# Patient Record
Sex: MALE | Race: WHITE | NOT HISPANIC OR LATINO | Employment: OTHER | URBAN - METROPOLITAN AREA
[De-identification: names, ages, dates, MRNs, and addresses within clinical notes are randomized per-mention and may not be internally consistent; named-entity substitution may affect disease eponyms.]

---

## 2018-05-03 ENCOUNTER — OFFICE VISIT (OUTPATIENT)
Dept: RHEUMATOLOGY | Facility: CLINIC | Age: 33
End: 2018-05-03
Payer: MEDICARE

## 2018-05-03 VITALS — WEIGHT: 228.63 LBS | SYSTOLIC BLOOD PRESSURE: 118 MMHG | DIASTOLIC BLOOD PRESSURE: 78 MMHG

## 2018-05-03 DIAGNOSIS — R76.8 POSITIVE ANA (ANTINUCLEAR ANTIBODY): Primary | ICD-10-CM

## 2018-05-03 LAB
ALBUMIN SERPL-MCNC: 3.6 G/DL (ref 3.1–4.7)
ALP SERPL-CCNC: 98 IU/L (ref 40–104)
ALT (SGPT): 18 IU/L (ref 3–33)
AST SERPL-CCNC: 19 IU/L (ref 10–40)
BASOPHILS NFR BLD: 0 K/UL (ref 0–0.2)
BASOPHILS NFR BLD: 0.4 %
BILIRUB SERPL-MCNC: 0.3 MG/DL (ref 0.3–1)
BUN SERPL-MCNC: 15 MG/DL (ref 8–20)
CALCIUM SERPL-MCNC: 9.1 MG/DL (ref 7.7–10.4)
CHLORIDE: 106 MMOL/L (ref 98–110)
CO2 SERPL-SCNC: 26.2 MMOL/L (ref 22.8–31.6)
CREATININE: 0.95 MG/DL (ref 0.6–1.4)
CRP SERPL-MCNC: 1.21 MG/DL (ref 0–1.4)
EOSINOPHIL NFR BLD: 0.1 K/UL (ref 0–0.7)
EOSINOPHIL NFR BLD: 0.9 %
ERYTHROCYTE [DISTWIDTH] IN BLOOD BY AUTOMATED COUNT: 12.1 % (ref 11.7–14.9)
GLUCOSE: 106 MG/DL (ref 70–99)
GRAN #: 4.8 K/UL (ref 1.4–6.5)
GRAN%: 71.4 %
HCT VFR BLD AUTO: 41.9 % (ref 39–55)
HGB BLD-MCNC: 14.5 G/DL (ref 14–16)
IMMATURE GRANS (ABS): 0 K/UL (ref 0–1)
IMMATURE GRANULOCYTES: 0.6 %
LYMPH #: 1.5 K/UL (ref 1.2–3.4)
LYMPH%: 23.1 %
MCH RBC QN AUTO: 30.2 PG (ref 25–35)
MCHC RBC AUTO-ENTMCNC: 34.6 G/DL (ref 31–36)
MCV RBC AUTO: 87.3 FL (ref 80–100)
MONO #: 0.2 K/UL (ref 0.1–0.6)
MONO%: 3.6 %
NUCLEATED RBCS: 0 %
PLATELET # BLD AUTO: 146 K/UL (ref 140–440)
PMV BLD AUTO: 11.7 FL (ref 8.8–12.7)
POTASSIUM SERPL-SCNC: 3.6 MMOL/L (ref 3.5–5)
PROT SERPL-MCNC: 6.7 G/DL (ref 6–8.2)
RBC # BLD AUTO: 4.8 M/UL (ref 4.3–5.9)
SODIUM: 141 MMOL/L (ref 134–144)
WBC # BLD AUTO: 6.7 K/UL (ref 5–10)

## 2018-05-03 PROCEDURE — 99203 OFFICE O/P NEW LOW 30 MIN: CPT | Mod: ,,, | Performed by: INTERNAL MEDICINE

## 2018-05-03 RX ORDER — LEVOCETIRIZINE DIHYDROCHLORIDE 5 MG/1
5 TABLET, FILM COATED ORAL 2 TIMES DAILY
Refills: 6 | COMMUNITY
Start: 2018-04-20 | End: 2019-05-08 | Stop reason: SDUPTHER

## 2018-05-03 RX ORDER — FENOFIBRATE 54 MG/1
54 TABLET ORAL DAILY
Refills: 1 | COMMUNITY
Start: 2018-02-23 | End: 2018-05-31

## 2018-05-03 RX ORDER — CIMETIDINE HYDROCHLORIDE ORAL SOLUTION 300 MG/5ML
SOLUTION ORAL
Refills: 6 | COMMUNITY
Start: 2018-04-16 | End: 2018-08-09

## 2018-05-03 NOTE — PROGRESS NOTES
[facility] RHEUMATOLOGY        NEW PATIENT      Subjective:       Patient ID:   NAME: Wilver Cee Jr. : 1985     32 y.o. male    Referring Doc: No ref. provider found  Other Physicians:    Chief Complaint:  Urticaria (Stopped fenofibrate for about 2 months. Positive JOSH)      History of Present Illness:     New patient referred for pos JOSH ( 1;320) done sec to hives.  Onset of hives about 2 months ago.Better since on meds prescribed by dermatologist  No other significant complaints as per father.          ROS:   GEN: no fevers night sweats or significant weight changes    fatigue  HEENT: no HA's, changes in vision , no mouth ulcers, no sicca symptoms, no scalp tenderness, jaw claudication  CV: no CP, SOB, PND, CRUZ or orthopnea,no palpitations  PULM:no SOB, cough, hemoptysis, sputum or pleuritic pain  GI: no abdominal pain, nausea, vomiting, constipation, diarrhea, melanotic stools, BRBPR, or hematemesis, no dysphagia  : no hematuria, dysuria  NEURO:no paresthesias, headaches, visual disturbances, muscle weakness  SKIN:  no rashes , erythema, bruising, or swelling, no Raynauds, no photosensitivity  MUSCULOSKELETAL:no joint swelling, no prolonged AM stiffness, no back pain   PSYCH:    Insomnia,   depression,  anxiety    Medications:    Current Outpatient Prescriptions:     cimetidine HCl (TAGAMET) 300 mg/5 mL solution, TAKE 6 ML(S) BY MOUTH TWICE A DAY, Disp: , Rfl: 6    fenofibrate (TRICOR) 54 MG tablet, Take 54 mg by mouth once daily., Disp: , Rfl: 1    levocetirizine (XYZAL) 5 MG tablet, Take 5 mg by mouth 2 (two) times daily., Disp: , Rfl: 6      FAMILY HISTORY: negative for Connective Tissue Disease      PAST MEDICAL HISTORY:Mental Retardation with Autistic tendencies,,A    PAST SURGICAL HISTORY:    SOCIAL HISTORY:Lives with father  Mother  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    Physical  Examination:   GEN: no apparent distress, comfortable; AAOx3  SKIN: few hives on abdomen, no other lesions, no sclerodactyly or induration, no Raynaud's, no periungual erythema  HEAD: normal  EYES: no pallor, no icterus,  NECK: no masses, thyroid normal, trachea midline, no LAD/LN's, supple  CV:   S1 and S2 regular, no murmurs, gallop or rubs  CHEST: Normal respiratory effort;  normal breath sounds; no rubs, no wheezes, no crackles.   ABDOM: nontender and nondistended; soft; ; no rebound/guarding,no masses  MUSC/Skeletal: ROM normal; no crepitus; joints without synovitis, no deformities   EXTREM: no clubbing, cyanosis, edema, normal pulses.  NEURO: grossly intact; motorWNL; AAOx3; no tremors  LYMPH: normal cervical, supraclavicular            Labs:   @RESUFAST(WBC,HGB,HCT,MCV,PLT)  )@RESUFAST(NA,K,CL,CO2,GLU,BUN,Creatinine,Calcium,PROT,Albumin,Bilitot,Alkphos,AST,ALT,JOSH,Sed Rate,CRP,RF,CCP)      Radiology/Diagnostic Studies:    I have reviewed all available labs and XRay reports    Assessment/Plan:   32 y.o. male history of hives for the past 2 months. He has improved after medication prescribed by dermatologist. He has a positive JOSH with a negative profile. No other clear manifestations of a connective tissue disease.  2) Hx of mental retardation with austistic tendencies      PLAN: Complement  Levels, CBC, CMP urinalysis, antiphospholipid antibodies,ANCA,CRP          Discussion:     I have explained all of the above in detail and the patient understands all of the current recommendation(s). I have answered all of their questions to the best of my ability and to their complete satisfaction.      I have reviewed the risks and benefits of the medication in detail with patient, who understands and wishes to proceed. Printed information regarding the disease and/or medication was also provided.        RTC1 month or before if needed        Electronically signed by Saji Eason MD

## 2018-05-05 LAB
C3 SERPL-MCNC: 153 MG/DL (ref 82–167)
C4 NEF SERPL-MCNC: 46 MG/DL (ref 14–44)
RHEUMATOID FACT SERPL-ACNC: <10 IU/ML (ref 0–13.9)

## 2018-05-06 LAB
CARDIOLIPIN IGA SER IA-ACNC: <9 APL U/ML (ref 0–11)
CARDIOLIPIN IGG SER IA-ACNC: <9 GPL U/ML (ref 0–14)
CARDIOLIPIN IGM SER IA-ACNC: <9 MPL U/ML (ref 0–12)

## 2018-05-07 LAB
CCP ANTIBODIES IGG/IGA: 4 UNITS (ref 0–19)
DRVVT CONFIRM: 43.1 SEC (ref 0–47)
LUPUS ANTICOAGULANT INTERPRETATION: NORMAL
PTT-LA MIX: 40.1 SEC (ref 0–51.9)

## 2018-05-08 LAB
B2 GLYCOPROTEIN I IGA: <9 GPI IGA UNITS (ref 0–25)
B2 GLYCOPROTEIN I IGG: <9 GPI IGG UNITS (ref 0–20)
B2 GLYCOPROTEIN I IGM: <9 GPI IGM UNITS (ref 0–32)

## 2018-05-21 ENCOUNTER — TELEPHONE (OUTPATIENT)
Dept: RHEUMATOLOGY | Facility: CLINIC | Age: 33
End: 2018-05-21

## 2018-05-21 NOTE — TELEPHONE ENCOUNTER
Anca, lupus anticoagulant and urinalysis not run as ordered on 5/3/18.  Patient has appt 5/31/18 .  Call pt back before appt or wait till appt.

## 2018-05-22 NOTE — TELEPHONE ENCOUNTER
Patient to have labs done before next visit if possible. If not will do at next visit . Per Dr. TONI Eason.  Lab will notify patient

## 2018-05-31 ENCOUNTER — OFFICE VISIT (OUTPATIENT)
Dept: RHEUMATOLOGY | Facility: CLINIC | Age: 33
End: 2018-05-31
Payer: MEDICARE

## 2018-05-31 VITALS — WEIGHT: 229.31 LBS | SYSTOLIC BLOOD PRESSURE: 128 MMHG | DIASTOLIC BLOOD PRESSURE: 84 MMHG

## 2018-05-31 DIAGNOSIS — R76.8 POSITIVE ANA (ANTINUCLEAR ANTIBODY): Primary | ICD-10-CM

## 2018-05-31 PROCEDURE — 99213 OFFICE O/P EST LOW 20 MIN: CPT | Mod: ,,, | Performed by: INTERNAL MEDICINE

## 2018-05-31 RX ORDER — ONDANSETRON HYDROCHLORIDE 4 MG/5ML
SOLUTION ORAL
Refills: 1 | COMMUNITY
Start: 2018-05-09 | End: 2022-06-29

## 2018-05-31 RX ORDER — PREDNISONE 20 MG/1
20 TABLET ORAL DAILY
COMMUNITY
End: 2018-08-09 | Stop reason: ALTCHOICE

## 2018-05-31 NOTE — PROGRESS NOTES
[facility] RHEUMATOLOGY            PROGRESS NOTE      Subjective:       Patient ID:   NAME: Wilver Cee Jr. : 1985     32 y.o. male    Referring Doc: No ref. provider found  Other Physicians:    Chief Complaint:  Positive JOSH      History of Present Illness:     Patient returns today for a regularly scheduled follow-up visit for + JOSH       The patient ,as per father. Doing well. Hives control with Tagamet and Xyzal.  Complains of chest pains cough. No joint swelling or complaints of arthralgias. No Raynaud's. No mucosal ulcerations.            ROS:   GEN:  No  fever, night sweats . weight is stable   No fatigue  SKIN: no rashes, no bruising, no ulcerations, no Raynaud's  HEENT: no HA's, No visual changes, no mucosal ulcers, no sicca symptoms,  CV:   no CP, SOB, PND, CRUZ, no orthopnea, no palpitations  PULM: normal with no SOB, cough, hemoptysis, sputum or pleuritic pain  GI:  no abdominal pain, nausea, vomiting, constipation, diarrhea, melanotic stools, BRBPR, hematemesis, no dysphagia  :   no dysuria  NEURO: no paresthesias, headaches, visual disturbances, muscle weakness  MUSCULOSKELETAL:no joint swelling, prolonged AM stiffness, no back pain, no muscle pain  Allergies:  Review of patient's allergies indicates:  No Known Allergies    Medications:    Current Outpatient Prescriptions:     cimetidine HCl (TAGAMET) 300 mg/5 mL solution, TAKE 6 ML(S) BY MOUTH TWICE A DAY, Disp: , Rfl: 6    levocetirizine (XYZAL) 5 MG tablet, Take 5 mg by mouth 2 (two) times daily., Disp: , Rfl: 6    ondansetron (ZOFRAN) 4 mg/5 mL solution, 5 ML BY MOUTH THREE TIMES A DAY AS NEEDED, Disp: , Rfl: 1    predniSONE (DELTASONE) 20 MG tablet, Take 20 mg by mouth once daily., Disp: , Rfl:     PMHx/PSHx Updates:        Objective:     Vitals:  Blood pressure 128/84, weight 104 kg (229 lb 4.8 oz).    Physical Examination:   GEN: no apparent distress, comfortable; AAOx3  SKIN: no rashes,no ulceration, no Raynaud's, no  petechiae, no SQ nodules,  HEAD: normal  EYES: no pallor, no icterus,  NECK: no masses, thyroid normal, trachea midline, no LAD/LN's, supple  CV: RRR with no murmur; l S1 and S2 reg. ,no gallop no rubs,   CHEST: Normal respiratory effort; CTAB; normal breath sounds; no wheeze or crackles  ABDOM: nontender and nondistended; soft; no masses; no rebound/guarding  MUSC/Skeletal: ROM normal; no crepitus; joints without synovitis,  no deformities  No joint swelling or tenderness of PIP, MCP, wrist, elbow, shoulder, or knee joints  EXTREM: no clubbing, cyanosis, no edema,normal  pulses   NEURO: grossly intact; motor WNL; AAOx3;   PSYCH: normal mood, affect and behavior  LYMPH: normal cervical, supraclavicular          Labs:   Lab Results   Component Value Date    WBC 6.7 05/03/2018    HGB 14.5 05/03/2018    HCT 41.9 05/03/2018    MCV 87.3 05/03/2018     05/03/2018    CMP  @LASTLAB(NA,K,CL,CO2,GLU,BUN,Creatinine,Calcium,PROT,Albumin,Bilitot,Alkphos,AST,ALT,CRP,ESR,RF,CCP,JOSH,SSA,CPK,uric acid) )@  I have reviewed all available lab results and radiology reports.    Radiology/Diagnostic Studies:        Assessment/Plan:   (1) 32 y.o. male with diagnosis of Positive JOSH 1 in 320 done secondary to hives. JOSH profile was negative, antiphospholipids neg, . CBC, CMP within normal range. Urinalysis was not done as ordered. Complement levels are not decreased  Hives are well controlled with medication given by dermatology.   2) Hx MR with Autistic Tendencies      Plan :UA   FU in  4 months      Discussion:     I have explained all of the above in detail and the patient understands all of the current recommendation(s). I have answered all questions to the best of my ability and to their complete satisfaction.       The patient is to continue with the current management plan         RTC in   Warm months      Electronically signed by Saji Eason MD

## 2018-08-09 ENCOUNTER — OFFICE VISIT (OUTPATIENT)
Dept: ALLERGY | Facility: CLINIC | Age: 33
End: 2018-08-09
Payer: MEDICARE

## 2018-08-09 VITALS
DIASTOLIC BLOOD PRESSURE: 78 MMHG | WEIGHT: 223.63 LBS | HEIGHT: 71 IN | SYSTOLIC BLOOD PRESSURE: 122 MMHG | BODY MASS INDEX: 31.31 KG/M2

## 2018-08-09 DIAGNOSIS — R53.82 CHRONIC FATIGUE: ICD-10-CM

## 2018-08-09 DIAGNOSIS — L50.8 CHRONIC URTICARIA: Primary | ICD-10-CM

## 2018-08-09 DIAGNOSIS — R11.10 CHRONIC VOMITING: ICD-10-CM

## 2018-08-09 PROCEDURE — 99204 OFFICE O/P NEW MOD 45 MIN: CPT | Mod: ,,, | Performed by: ALLERGY & IMMUNOLOGY

## 2018-08-09 RX ORDER — LORATADINE 10 MG/1
10 TABLET ORAL DAILY PRN
COMMUNITY

## 2018-08-09 RX ORDER — FEXOFENADINE HCL 60 MG
60 TABLET ORAL 2 TIMES DAILY
Qty: 60 TABLET | Refills: 3 | Status: SHIPPED | OUTPATIENT
Start: 2018-08-09 | End: 2022-06-29

## 2018-08-09 NOTE — PROGRESS NOTES
"Subjective:       Patient ID: Wilver Cee Jr. is a 33 y.o. male.    Chief Complaint: Urticaria (Breakouts have been everyday)    HPI     Pt presents as a consult from Dr. Tanner Robbins for urticaria.     Onset: march of this year  Frequ: daily   Location: generalized   Tx: h1 and h2 and prednisone   Asso: swelling with rash     He has been vomiting recurrently for 7-8 years. Not related per say.     Father denies that his thyroid has been evaluated and has concern about possible hyperglycemia and DM.   Pt has cognitive delay and Father remains caregiver.       Review of Systems      General: neg unexpected weight changes, fevers, chills, night sweats, malaise  HEENT: see hpi, Neg eye pain, vision changes, ear drainage, nose bleeds, throat tightness, sores in the mouth  CV: Neg chest pain, palpitations, swelling  Resp: see hpi, neg shortness of breath, hemoptysis, cough  GI: see hpi, neg dysphagia, night abdominal pain, reflux, chronic diarrhea, chronic constipation  Derm: See Hpi, neg flushing  Mu/sk: Neg joint pain, joint swelling   Psych: Neg anxiety  neuro: neg chronic headaches, muscle weakness  Endo: neg heat/cold intolerance, chronic fatigue    Objective:     Vitals:    08/09/18 1042   BP: 122/78   Weight: 101.4 kg (223 lb 9.6 oz)   Height: 5' 11" (1.803 m)        Physical Exam      General: no acute distress, well developed well nourished - mental cognition impaired. With father   Skin: multiple erythematous papules on the left forearm.       Assessment:       1. Chronic urticaria    2. Chronic vomiting    3. Chronic fatigue        Plan:       Chronic urticaria  -     fexofenadine (ALLEGRA) 60 MG tablet; Take 1 tablet (60 mg total) by mouth 2 (two) times daily.  Dispense: 60 tablet; Refill: 3  -     Hemoglobin A1C; Future; Expected date: 08/09/2018  -     TSH; Future; Expected date: 08/09/2018    Chronic vomiting    Chronic fatigue  -     TSH; Future; Expected date: 08/09/2018    Other orders  -     " Hemoglobin A1c      Follow up in 4 weeks     Discussed face to face > 50 % > 45 mins discussing what causes hives, medications, urticaria action plan, testing, and answered all questions.     Reviewed all outside labs- negative inhalant panel, assumed labs were fasting- glucose 106 therefore a1c ordered. crp normal. Other wise cbc and cmp wnl.         Alessandra Martinez M.D.  Allergy/Immunology  [facility]   [phone] phone  [phone] fax

## 2018-08-10 PROBLEM — R73.9 HYPERGLYCEMIA: Status: RESOLVED | Noted: 2018-08-10 | Resolved: 2018-08-10

## 2018-08-10 PROBLEM — R53.82 CHRONIC FATIGUE: Status: ACTIVE | Noted: 2018-08-10

## 2018-08-10 PROBLEM — R73.9 HYPERGLYCEMIA: Status: ACTIVE | Noted: 2018-08-10

## 2018-08-10 LAB
HBA1C MFR BLD: 5.4 % (ref 4.8–5.6)
TSH SERPL DL<=0.005 MIU/L-ACNC: 2.49 UIU/ML (ref 0.45–4.5)

## 2018-09-12 ENCOUNTER — OFFICE VISIT (OUTPATIENT)
Dept: ALLERGY | Facility: CLINIC | Age: 33
End: 2018-09-12
Payer: MEDICARE

## 2018-09-12 VITALS
WEIGHT: 232 LBS | DIASTOLIC BLOOD PRESSURE: 76 MMHG | HEIGHT: 70 IN | SYSTOLIC BLOOD PRESSURE: 122 MMHG | BODY MASS INDEX: 33.21 KG/M2

## 2018-09-12 DIAGNOSIS — B35.4 TINEA CORPORIS: ICD-10-CM

## 2018-09-12 DIAGNOSIS — L50.8 CHRONIC URTICARIA: Primary | ICD-10-CM

## 2018-09-12 PROCEDURE — 99215 OFFICE O/P EST HI 40 MIN: CPT | Mod: ,,, | Performed by: ALLERGY & IMMUNOLOGY

## 2018-09-12 RX ORDER — ERGOCALCIFEROL 1.25 MG/1
50000 CAPSULE ORAL
COMMUNITY
End: 2019-05-08

## 2018-09-12 RX ORDER — FLUCONAZOLE 40 MG/ML
100 POWDER, FOR SUSPENSION ORAL DAILY
Qty: 12.5 ML | Refills: 0 | Status: SHIPPED | OUTPATIENT
Start: 2018-09-12 | End: 2018-09-17

## 2018-09-12 NOTE — PATIENT INSTRUCTIONS
Hives:  Allegra 180 mg 1 pill twice per day   pepcid 20 mg 1 pill twice per day if needed  May max out allegra 180 2 pills twice per day.     Fungal infection:  Take fluconazole 2.5 mL once per day for five days, if better in 3 days stop  Use over the counter antifungal spray four times per day - may need to use 6 weeks   Use nystatin powder on feet and groin 4 times per day as needed as well.     May interchange or rotate allegra, loratadine, claritin, zyrtec, xyzal they all have same mechanism of action.     Follow up in 8 weeks.

## 2018-09-12 NOTE — PROGRESS NOTES
"Subjective:       Patient ID: Wilver Cee Jr. is a 33 y.o. male.    Chief Complaint: Urticaria (recurrent hives since March)    HPI     Pt presents for urticaria.     Condition: improved  Frequ: daily - mainly at night   Location: generalized   Tx: h1 and h2 and prednisone- prn   1 allegra 60 mg bid.   Discussed increasing to 180 mg bid.    Asso: swelling with rash   Onset: march of this year  He has been vomiting recurrently for 7-8 years. Not related per say.   He has been battling tinea on the skin for 5 months.     Pt has cognitive delay and Father remains caregiver.     Pt had normal A1C levels and TSH    Component      Latest Ref Rng & Units 8/9/2018   TSH      0.450 - 4.500 uIU/mL 2.490   Hemoglobin A1C      4.8 - 5.6 % 5.4       Review of Systems      General: neg unexpected weight changes, fevers, chills, night sweats, malaise  HEENT: see hpi, Neg eye pain, vision changes, ear drainage, nose bleeds, throat tightness, sores in the mouth  CV: Neg chest pain, palpitations, swelling  Resp: see hpi, neg shortness of breath, hemoptysis, cough  GI: see hpi, neg dysphagia, night abdominal pain, reflux, chronic diarrhea, chronic constipation  Derm: See Hpi, neg flushing  Mu/sk: Neg joint pain, joint swelling   Psych: Neg anxiety  neuro: neg chronic headaches, muscle weakness  Endo: neg heat/cold intolerance, chronic fatigue    Objective:     Vitals:    09/12/18 1444   BP: 122/76   Weight: 105.2 kg (232 lb)   Height: 5' 10" (1.778 m)        Physical Exam      General: no acute distress, well developed well nourished - mental cognition impaired. With father   Skin: multiple erythematous papules on the left forearm. Xerosis bilateral heels.       Assessment:       1. Chronic urticaria    2. Tinea corporis        Plan:       Chronic urticaria    Tinea corporis  -     fluconazole 40 mg/ml (DIFLUCAN) 40 mg/mL suspension; Take 2.5 mLs (100 mg total) by mouth once daily. for 5 days  Dispense: 12.5 mL; Refill: " 0      Allegra 180 mg 1 pill twice per day   pepcid 20 mg 1 pill twice per day if needed  May max out allegra 180 2 pills twice per day.     Fungal infection:  Take fluconazole 2.5 mL once per day for five days, if better in 3 days stop  Use over the counter antifungal spray four times per day - may need to use 6 weeks   Use nystatin powder on feet and groin 4 times per day as needed as well.           Follow up in 8 weeks     Discussed face to face > 50 % > 40 mins discussing what causes hives, medications, urticaria action plan, testing, and answered all questions.     Reviewed labs- negative inhalant panel, a1c crp tsh normal. cbc and cmp wnl.         Alessandra Martinez M.D.  Allergy/Immunology  [facility]   [phone] phone  [phone] fax

## 2018-09-13 ENCOUNTER — TELEPHONE (OUTPATIENT)
Dept: ALLERGY | Facility: CLINIC | Age: 33
End: 2018-09-13

## 2018-09-20 ENCOUNTER — OFFICE VISIT (OUTPATIENT)
Dept: RHEUMATOLOGY | Facility: CLINIC | Age: 33
End: 2018-09-20
Payer: MEDICARE

## 2018-09-20 VITALS — SYSTOLIC BLOOD PRESSURE: 108 MMHG | WEIGHT: 228 LBS | DIASTOLIC BLOOD PRESSURE: 72 MMHG | BODY MASS INDEX: 32.71 KG/M2

## 2018-09-20 DIAGNOSIS — E55.9 HYPOVITAMINOSIS D: ICD-10-CM

## 2018-09-20 DIAGNOSIS — R76.8 POSITIVE ANA (ANTINUCLEAR ANTIBODY): Primary | ICD-10-CM

## 2018-09-20 LAB
ALBUMIN SERPL-MCNC: 3.9 G/DL (ref 3.1–4.7)
ALP SERPL-CCNC: 85 IU/L (ref 40–104)
ALT (SGPT): 16 IU/L (ref 3–33)
AST SERPL-CCNC: 19 IU/L (ref 10–40)
BASOPHILS NFR BLD: 0 K/UL (ref 0–0.2)
BASOPHILS NFR BLD: 0.2 %
BILIRUB SERPL-MCNC: 0.4 MG/DL (ref 0.3–1)
BUN SERPL-MCNC: 15 MG/DL (ref 8–20)
CALCIUM SERPL-MCNC: 8.7 MG/DL (ref 7.7–10.4)
CHLORIDE: 103 MMOL/L (ref 98–110)
CO2 SERPL-SCNC: 25.9 MMOL/L (ref 22.8–31.6)
CREATININE: 0.97 MG/DL (ref 0.6–1.4)
EOSINOPHIL NFR BLD: 0.1 K/UL (ref 0–0.7)
EOSINOPHIL NFR BLD: 1.6 %
ERYTHROCYTE [DISTWIDTH] IN BLOOD BY AUTOMATED COUNT: 13.1 % (ref 11.7–14.9)
GLUCOSE: 106 MG/DL (ref 70–99)
GRAN #: 3.8 K/UL (ref 1.4–6.5)
GRAN%: 70 %
HCT VFR BLD AUTO: 46.2 % (ref 39–55)
HGB BLD-MCNC: 15.4 G/DL (ref 14–16)
IMMATURE GRANS (ABS): 0 K/UL (ref 0–1)
IMMATURE GRANULOCYTES: 0.5 %
LYMPH #: 1.4 K/UL (ref 1.2–3.4)
LYMPH%: 25.7 %
MCH RBC QN AUTO: 29.1 PG (ref 25–35)
MCHC RBC AUTO-ENTMCNC: 33.3 G/DL (ref 31–36)
MCV RBC AUTO: 87.2 FL (ref 80–100)
MONO #: 0.1 K/UL (ref 0.1–0.6)
MONO%: 2 %
NUCLEATED RBCS: 0 %
PLATELET # BLD AUTO: 143 K/UL (ref 140–440)
PMV BLD AUTO: 11.3 FL (ref 8.8–12.7)
POTASSIUM SERPL-SCNC: 4 MMOL/L (ref 3.5–5)
PROT SERPL-MCNC: 6.6 G/DL (ref 6–8.2)
RBC # BLD AUTO: 5.3 M/UL (ref 4.3–5.9)
SODIUM: 136 MMOL/L (ref 134–144)
VITAMIN D, 1,25 (OH)2: 30.7 NG/ML (ref 30–100)
WBC # BLD AUTO: 5.5 K/UL (ref 5–10)

## 2018-09-20 PROCEDURE — 99213 OFFICE O/P EST LOW 20 MIN: CPT | Mod: ,,, | Performed by: INTERNAL MEDICINE

## 2018-09-20 RX ORDER — NYSTATIN 100000 [USP'U]/G
POWDER TOPICAL 2 TIMES DAILY
Refills: 1 | COMMUNITY
Start: 2018-08-20 | End: 2021-09-28 | Stop reason: SDUPTHER

## 2018-09-20 RX ORDER — CLOTRIMAZOLE 1 G/ML
SOLUTION TOPICAL 2 TIMES DAILY
COMMUNITY
End: 2022-06-29

## 2018-09-20 RX ORDER — DIPHENHYDRAMINE HCL 25 MG
25 TABLET ORAL NIGHTLY PRN
COMMUNITY
End: 2020-11-11

## 2018-09-20 NOTE — PROGRESS NOTES
[facility] RHEUMATOLOGY            PROGRESS NOTE      Subjective:       Patient ID:   NAME: Wilver Cee Jr. : 1985     33 y.o. male    Referring Doc: No ref. provider found  Other Physicians:    Chief Complaint:  Positive JOSH (Still has hives. Has had Prednisone 20mg twice in 5 months)      History of Present Illness:     Patient returns today for a regularly scheduled follow-up visit for positive JOSH.      The patient CNS with chronic hives. He was seen in the emergency room due to vomiting and vasovagal syncope  No joint swelling. Father has not noticed any mucosal ulcerations.          ROS:   GEN:  No  fever, night sweats . weight is stable   No apparent  fatigue  SKIN: + hives, no bruising, no ulcerations, no Raynaud's  HEENT: no HA's, No visual changes, no mucosal ulcers, no sicca symptoms,  CV:   no CP, SOB, PND, CRUZ, no orthopnea, no palpitations  PULM: normal with no SOB, cough, hemoptysis, sputum or pleuritic pain  GI:  no abdominal pain, nausea, + occ vomiting, constipation, diarrhea, melanotic stools, BRBPR, hematemesis  MUSCULOSKELETAL:no joint swelling, prolonged AM stiffness, no back pain, no muscle pain  Allergies:  Review of patient's allergies indicates:  No Known Allergies    Medications:    Current Outpatient Medications:     clotrimazole (LOTRIMIN) 1 % Soln, Apply topically 2 (two) times daily., Disp: , Rfl:     diphenhydrAMINE (SOMINEX) 25 mg tablet, Take 25 mg by mouth nightly as needed for Insomnia., Disp: , Rfl:     ergocalciferol (VITAMIN D2) 50,000 unit Cap, Take 50,000 Units by mouth every 7 days., Disp: , Rfl:     fexofenadine (ALLEGRA) 60 MG tablet, Take 1 tablet (60 mg total) by mouth 2 (two) times daily., Disp: 60 tablet, Rfl: 3    levocetirizine (XYZAL) 5 MG tablet, Take 5 mg by mouth 2 (two) times daily., Disp: , Rfl: 6    loratadine (CLARITIN) 10 mg tablet, Take 10 mg by mouth daily as needed for Allergies., Disp: , Rfl:     NYSTOP powder, 2 (two) times daily.  Apply to affected area, Disp: , Rfl: 1    ondansetron (ZOFRAN) 4 mg/5 mL solution, 5 ML BY MOUTH THREE TIMES A DAY AS NEEDED, Disp: , Rfl: 1    PMHx/PSHx Updates:        Objective:     Vitals:  Blood pressure 108/72, weight 103.4 kg (228 lb).    Physical Examination:   GEN: no apparent distress, comfortable; AAOx3  SKIN: +hives arms,dorsum hands ,no ulceration, no Raynaud's, no petechiae, no SQ nodules,  HEAD: normal  EYES: no pallor, no icterus,   NECK: no masses, thyroid normal, trachea midline, no LAD/LN's, supple  CV: RRR with no murmur; l S1 and S2 reg. ,no gallop no rubs,   CHEST: Normal respiratory effort; CTAB; normal breath sounds; no wheeze or crackles  MUSC/Skeletal: ROM normal; no crepitus; joints without synovitis,  no deformities  No joint swelling or tenderness of PIP, MCP, wrist, elbow, shoulder, or knee joints  EXTREM: no clubbing, cyanosis, no edema,normal  pulses   NEURO: grossly intact; motor WNL; AAOx3;   LYMPH: normal cervical, supraclavicular          Labs:   Lab Results   Component Value Date    WBC 6.7 05/03/2018    HGB 14.5 05/03/2018    HCT 41.9 05/03/2018    MCV 87.3 05/03/2018     05/03/2018    CMP  @LASTLAB(NA,K,CL,CO2,GLU,BUN,Creatinine,Calcium,PROT,Albumin,Bilitot,Alkphos,AST,ALT,CRP,ESR,RF,CCP,JOSH,SSA,CPK,uric acid) )@  I have reviewed all available lab results and radiology reports.    Radiology/Diagnostic Studies:        Assessment/Plan:   (1) 33 y.o. male with diagnosis of positive JOSH 1 in 320. History of chronic hives. No other complaints as per father.  2) MR  with autistic  tendencies      CBC CMP urinalysis. Father requests vitamin D level.        Discussion:     I have explained all of the above in detail and the patient understands all of the current recommendation(s). I have answered all questions to the best of my ability and to their complete satisfaction.       The patient is to continue with the current management plan         RTC in   4 months or before if  needed      Electronically signed by Saji Eason MD

## 2018-11-07 ENCOUNTER — OFFICE VISIT (OUTPATIENT)
Dept: ALLERGY | Facility: CLINIC | Age: 33
End: 2018-11-07
Payer: MEDICARE

## 2018-11-07 VITALS
SYSTOLIC BLOOD PRESSURE: 122 MMHG | HEIGHT: 71 IN | WEIGHT: 225 LBS | DIASTOLIC BLOOD PRESSURE: 76 MMHG | BODY MASS INDEX: 31.5 KG/M2

## 2018-11-07 DIAGNOSIS — L50.8 CHRONIC URTICARIA: Primary | ICD-10-CM

## 2018-11-07 DIAGNOSIS — B35.3 TINEA PEDIS OF BOTH FEET: ICD-10-CM

## 2018-11-07 PROCEDURE — 99213 OFFICE O/P EST LOW 20 MIN: CPT | Mod: ,,, | Performed by: ALLERGY & IMMUNOLOGY

## 2018-11-07 NOTE — PATIENT INSTRUCTIONS
Continue current regimen.     Vitamin D 2000, IU  Daily.       Nystatin can be used four times per day.

## 2018-11-07 NOTE — PROGRESS NOTES
"Subjective:       Patient ID: Wilver Cee Jr. is a 33 y.o. male.    Chief Complaint: Urticaria (doing better - not as often or as many)    HPI     Pt presents for urticaria.     Condition: improved  Frequ: daily - mainly at night   Location: generalized   Pt doing well with vitamin D and stopped sweet n low.   Tx: h1 and h2 and prednisone- prn alternativing allegra, claritin, xyzal. pepcid three times per week.   1 allegra 60 mg, bid.   Last week was most recent episode.   Asso: swelling with rash   Onset: march of this year  He has been vomiting recurrently for 7-8 years. Not related per say.   He has been battling tinea on the skin for 5 months. - since treatment at the last visit, on the feet cleared. Groin still an issue however.     Pt has cognitive delay and Father remains caregiver.     Pt had normal A1C levels and TSH    Component      Latest Ref Rng & Units 8/9/2018   TSH      0.450 - 4.500 uIU/mL 2.490   Hemoglobin A1C      4.8 - 5.6 % 5.4       Review of Systems      General: neg unexpected weight changes, fevers, chills, night sweats, malaise  HEENT: see hpi, Neg eye pain, vision changes, ear drainage, nose bleeds, throat tightness, sores in the mouth  CV: Neg chest pain, palpitations, swelling  Resp: see hpi, neg shortness of breath, hemoptysis, cough  GI: see hpi, neg dysphagia, night abdominal pain, reflux, chronic diarrhea, chronic constipation  Derm: See Hpi, neg flushing  Mu/sk: Neg joint pain, joint swelling   Psych: Neg anxiety  neuro: neg chronic headaches, muscle weakness  Endo: neg heat/cold intolerance, chronic fatigue    Objective:     Vitals:    11/07/18 1422   BP: 122/76   Weight: 102.1 kg (225 lb)   Height: 5' 11" (1.803 m)        Physical Exam      General: no acute distress, well developed well nourished - mental cognition impaired. With father   Skin: feet with xerosis on the bottom of the heels. No urticarial lesions.       Assessment:       1. Chronic urticaria    2. Tinea " pedis of both feet        Plan:       Chronic urticaria    Tinea pedis of both feet      Allegra  mg 1 pill twice per day  Or any antihistamine   pepcid 20 mg 1 pill twice per day if needed   May max out allegra 180 2 pills twice per day.   Once urticaria is gone x 4 weeks then, may start to taper and discussed today.     Continue vitamin D supplementation.     Fungal infection:  Increase nystatin to QID.       Follow up in 6 months.     Discussed face to face > 50 % > 30 mins discussing what causes hives, medications, urticaria action plan, testing, and answered all questions.     Reviewed labs- negative inhalant panel, a1c crp tsh normal. cbc and cmp wnl.         Alessandra Martinez M.D.  Allergy/Immunology  [facility]   [phone] phone  [phone] fax

## 2019-01-16 ENCOUNTER — OFFICE VISIT (OUTPATIENT)
Dept: RHEUMATOLOGY | Facility: CLINIC | Age: 34
End: 2019-01-16
Payer: MEDICARE

## 2019-01-16 VITALS
DIASTOLIC BLOOD PRESSURE: 78 MMHG | SYSTOLIC BLOOD PRESSURE: 119 MMHG | BODY MASS INDEX: 32.09 KG/M2 | WEIGHT: 230.13 LBS

## 2019-01-16 DIAGNOSIS — R76.8 POSITIVE ANA (ANTINUCLEAR ANTIBODY): Primary | ICD-10-CM

## 2019-01-16 PROCEDURE — 99213 PR OFFICE/OUTPT VISIT, EST, LEVL III, 20-29 MIN: ICD-10-PCS | Mod: ,,, | Performed by: INTERNAL MEDICINE

## 2019-01-16 PROCEDURE — 99213 OFFICE O/P EST LOW 20 MIN: CPT | Mod: ,,, | Performed by: INTERNAL MEDICINE

## 2019-01-17 NOTE — PROGRESS NOTES
[facility] RHEUMATOLOGY            PROGRESS NOTE      Subjective:       Patient ID:   NAME: Wilver Cee Jr. : 1985     33 y.o. male    Referring Doc: No ref. provider found  Other Physicians:    Chief Complaint:  Positive JOSH      History of Present Illness:     Patient returns today for a regularly scheduled follow-up visit for   + JOSH      Father states continues with episodes of hives.  No apparent cough or shortness of breath. No complaints of joint pain. Father states occasionally they right hand looks diffusely swollen.            ROS:   GEN:  No  fever, night sweats . weight is stable   ? fatigue  SKIN: hives on and off, no bruising, no ulcerations, no Raynaud's  HEENT: no HA's, No visual changes, no mucosal ulcers, no sicca symptoms,  PULM: normal with no  Apparent SOB, cough, hemoptysis, sputum or pleuritic pain  GI:  no abdominal pain  :   no dysuria  NEURO: no paresthesias, headaches, visual disturbances, muscle weakness  MUSCULOSKELETAL:no joint swelling, prolonged AM stiffness, no back pain, no muscle pain  Allergies:  Review of patient's allergies indicates:  No Known Allergies    Medications:    Current Outpatient Medications:     clotrimazole (LOTRIMIN) 1 % Soln, Apply topically 2 (two) times daily., Disp: , Rfl:     diphenhydrAMINE (SOMINEX) 25 mg tablet, Take 25 mg by mouth nightly as needed for Insomnia., Disp: , Rfl:     ergocalciferol (VITAMIN D2) 50,000 unit Cap, Take 50,000 Units by mouth every 7 days., Disp: , Rfl:     famotidine (PEPCID ORAL), Take by mouth., Disp: , Rfl:     fexofenadine (ALLEGRA) 60 MG tablet, Take 1 tablet (60 mg total) by mouth 2 (two) times daily., Disp: 60 tablet, Rfl: 3    levocetirizine (XYZAL) 5 MG tablet, Take 5 mg by mouth 2 (two) times daily., Disp: , Rfl: 6    loratadine (CLARITIN) 10 mg tablet, Take 10 mg by mouth daily as needed for Allergies., Disp: , Rfl:     NYSTOP powder, 2 (two) times daily. Apply to affected area, Disp: , Rfl:  1    ondansetron (ZOFRAN) 4 mg/5 mL solution, 5 ML BY MOUTH THREE TIMES A DAY AS NEEDED, Disp: , Rfl: 1    PMHx/PSHx Updates:        Objective:     Vitals:  Blood pressure 119/78, weight 104.4 kg (230 lb 1.6 oz).    Physical Examination:   GEN: no apparent distress, comfortable; AAOx3  SKIN: no rashes,no ulceration, no Raynaud's, no petechiae, no SQ nodules,  HEAD: normal  EYES: no pallor, no icterus,  NECK: no masses, thyroid normal, trachea midline, no LAD/LN's, supple  CV: RRR with no murmur; l S1 and S2 reg. ,no gallop no rubs,   CHEST: Normal respiratory effort; CTAB; normal breath sounds; no wheeze or crackles  MUSC/Skeletal: ROM normal; no crepitus; joints without synovitis,  No joint swelling or tenderness of PIP, MCP, wrist, elbow, shoulder, or knee joints  EXTREM: no clubbing, cyanosis, no edema,normal  pulses   NEURO: grossly intact; motor WNL; AAOx3;  LYMPH: normal cervical, supraclavicular          Labs:   Lab Results   Component Value Date    WBC 5.5 09/20/2018    HGB 15.4 09/20/2018    HCT 46.2 09/20/2018    MCV 87.2 09/20/2018     09/20/2018    CMP  @LASTLAB(NA,K,CL,CO2,GLU,BUN,Creatinine,Calcium,PROT,Albumin,Bilitot,Alkphos,AST,ALT,CRP,ESR,RF,CCP,JOSH,SSA,CPK,uric acid) )@  I have reviewed all available lab results and radiology reports.    Radiology/Diagnostic Studies:        Assessment/Plan:   (1) 33 y.o. male with diagnosis of positive JOSH 1:640 no other signs or symptoms of  connective tissue disease. Most recent blood work showed a normal CBC except for slight thrombocytopenia of 139,000. Normal CMP. Will do urinalysis today.  2) Hx hives  3) Hx MR    Repeat CBC in a few weeks.      Discussion:     I have explained all of the above in detail and the patient understands all of the current recommendation(s). I have answered all questions to the best of my ability and to their complete satisfaction.       The patient is to continue with the current management plan         RTC in   3  months      Electronically signed by Saji Eason MD

## 2019-02-13 LAB
BASOPHILS NFR BLD: 0 K/UL (ref 0–0.2)
BASOPHILS NFR BLD: 0.1 %
EOSINOPHIL NFR BLD: 0 K/UL (ref 0–0.7)
EOSINOPHIL NFR BLD: 0.5 %
ERYTHROCYTE [DISTWIDTH] IN BLOOD BY AUTOMATED COUNT: 12.8 % (ref 11.7–14.9)
GRAN #: 6.4 K/UL (ref 1.4–6.5)
GRAN%: 80.6 %
HCT VFR BLD AUTO: 44.7 % (ref 39–55)
HGB BLD-MCNC: 15.2 G/DL (ref 14–16)
IMMATURE GRANS (ABS): 0.1 K/UL (ref 0–1)
IMMATURE GRANULOCYTES: 0.6 %
LYMPH #: 1.2 K/UL (ref 1.2–3.4)
LYMPH%: 15.1 %
MCH RBC QN AUTO: 30.3 PG (ref 25–35)
MCHC RBC AUTO-ENTMCNC: 34 G/DL (ref 31–36)
MCV RBC AUTO: 89 FL (ref 80–100)
MONO #: 0.3 K/UL (ref 0.1–0.6)
MONO%: 3.1 %
NUCLEATED RBCS: 0 %
PLATELET # BLD AUTO: 157 K/UL (ref 140–440)
PMV BLD AUTO: 11.2 FL (ref 8.8–12.7)
RBC # BLD AUTO: 5.02 M/UL (ref 4.3–5.9)
WBC # BLD AUTO: 8 K/UL (ref 5–10)

## 2019-02-15 ENCOUNTER — TELEPHONE (OUTPATIENT)
Dept: ALLERGY | Facility: CLINIC | Age: 34
End: 2019-02-15

## 2019-02-15 NOTE — TELEPHONE ENCOUNTER
Patient's father is calling with reports that he thinks Wilver may be allergic to Shrimp.  He would like to have a skin test done to verify the negative lab work done a year ago by Dr. Cutler.  The last note does not mention doing a skin test, what to do?

## 2019-02-18 NOTE — TELEPHONE ENCOUNTER
Patient's father states that he has been without shrimp for 14+ days and the hives/rash have subsided.  He would like to continue with avoidance to see if this solves the problem.  He will call back with any needs.

## 2019-04-30 ENCOUNTER — TELEPHONE (OUTPATIENT)
Dept: RHEUMATOLOGY | Facility: CLINIC | Age: 34
End: 2019-04-30

## 2019-05-07 ENCOUNTER — OFFICE VISIT (OUTPATIENT)
Dept: RHEUMATOLOGY | Facility: CLINIC | Age: 34
End: 2019-05-07
Payer: MEDICARE

## 2019-05-07 VITALS — SYSTOLIC BLOOD PRESSURE: 114 MMHG | BODY MASS INDEX: 30.75 KG/M2 | WEIGHT: 220.5 LBS | DIASTOLIC BLOOD PRESSURE: 78 MMHG

## 2019-05-07 DIAGNOSIS — R76.8 POSITIVE ANA (ANTINUCLEAR ANTIBODY): Primary | ICD-10-CM

## 2019-05-07 PROCEDURE — 99213 OFFICE O/P EST LOW 20 MIN: CPT | Mod: ,,, | Performed by: INTERNAL MEDICINE

## 2019-05-07 PROCEDURE — 99213 PR OFFICE/OUTPT VISIT, EST, LEVL III, 20-29 MIN: ICD-10-PCS | Mod: ,,, | Performed by: INTERNAL MEDICINE

## 2019-05-07 NOTE — PROGRESS NOTES
"       [facility] RHEUMATOLOGY            PROGRESS NOTE      Subjective:       Patient ID:   NAME: Wilver Cee Jr. : 1985     33 y.o. male    Referring Doc: No ref. provider found  Other Physicians:    Chief Complaint:  Positive JOSH      History of Present Illness:     Patient returns today for a regularly scheduled follow-up visit for positive JOSH    The patient continues with" hives"arms,legs and thorax.Better with medications.  Has occasional episodes of projectile vomiting. No joint swelling. No complaints of headaches. No complaints of abdominal pains            ROS:   GEN:  No  fever, night sweats + sl weight loss    SKIN: + rashes, no bruising, no ulcerations, no Raynaud's  HEENT: no HA's, , no mucosal ulcers, no sicca   CV:   no CP, SOB, PND, CRUZ, no orthopnea, no palpitations  PULM: normal with no cough, hemoptysis, sputum   GI:  no abdominal pain, nausea, vomiting, constipation, diarrhea, melanotic stools, BRBPR, hematemesis, no dysphagia  NEURO: no frequent complaints of headaches, visual disturbances, muscle weakness  MUSCULOSKELETAL:no joint swelling,   Allergies:  Review of patient's allergies indicates:  No Known Allergies    Medications:    Current Outpatient Medications:     clotrimazole (LOTRIMIN) 1 % Soln, Apply topically 2 (two) times daily., Disp: , Rfl:     diphenhydrAMINE (SOMINEX) 25 mg tablet, Take 25 mg by mouth nightly as needed for Insomnia., Disp: , Rfl:     ergocalciferol (VITAMIN D2) 50,000 unit Cap, Take 50,000 Units by mouth every 7 days., Disp: , Rfl:     famotidine (PEPCID ORAL), Take by mouth., Disp: , Rfl:     fexofenadine (ALLEGRA) 60 MG tablet, Take 1 tablet (60 mg total) by mouth 2 (two) times daily., Disp: 60 tablet, Rfl: 3    levocetirizine (XYZAL) 5 MG tablet, Take 5 mg by mouth 2 (two) times daily., Disp: , Rfl: 6    loratadine (CLARITIN) 10 mg tablet, Take 10 mg by mouth daily as needed for Allergies., Disp: , Rfl:     NYSTOP powder, 2 (two) times daily. " Apply to affected area, Disp: , Rfl: 1    ondansetron (ZOFRAN) 4 mg/5 mL solution, 5 ML BY MOUTH THREE TIMES A DAY AS NEEDED, Disp: , Rfl: 1    PMHx/PSHx Updates:          Objective:     Vitals:  Blood pressure 114/78, weight 100 kg (220 lb 8 oz).    Physical Examination:   GEN: no apparent distress, comfortable; AAOx3  SKIN: + ,no ulceration, no Raynaud's, no petechiae, no SQ nodules,  HEAD: normal  EYES: no pallor, no icterus,  NECK: no masses, thyroid normal, trachea midline, no LAD/LN's, supple  CV: RRR with no murmur; l S1 and S2 reg. ,no gallop no rubs,   CHEST: Normal respiratory effort; CTAB; normal breath sounds; no wheeze or crackles  MUSC/Skeletal: ROM normal; no crepitus; joints without synovitis,  no deformities  No joint swelling or tenderness of PIP, MCP, wrist, elbow, shoulder, or knee joints  EXTREM: no clubbing, cyanosis, no edema,normal  pulses  ,  LYMPH: normal cervical, supraclavicular          Labs:   Lab Results   Component Value Date    WBC 8.0 02/13/2019    HGB 15.2 02/13/2019    HCT 44.7 02/13/2019    MCV 89.0 02/13/2019     02/13/2019    CMP  @LASTLAB(NA,K,CL,CO2,GLU,BUN,Creatinine,Calcium,PROT,Albumin,Bilitot,Alkphos,AST,ALT,CRP,ESR,RF,CCP,JOSH,SSA,CPK,uric acid) )@  I have reviewed all available lab results and radiology reports.    Radiology/Diagnostic Studies:        Assessment/Plan:   (1) 33 y.o. male with diagnosis of positive JOSH. No other signs or symptoms of a connective tissue disorder.  2) Hx chronic urticaria  3) MR    CBC CMP and urinalysis; thyroid antibodies( will do within next month)    Discussion:     I have explained all of the above in detail and the patient understands all of the current recommendation(s). I have answered all questions to the best of my ability and to their complete satisfaction.       The patient is to continue with the current management plan         RTC in   4 months or before if needed    Electronically signed by Saji Eason  MD

## 2019-05-08 ENCOUNTER — OFFICE VISIT (OUTPATIENT)
Dept: ALLERGY | Facility: CLINIC | Age: 34
End: 2019-05-08
Payer: MEDICARE

## 2019-05-08 VITALS
HEIGHT: 71 IN | DIASTOLIC BLOOD PRESSURE: 62 MMHG | BODY MASS INDEX: 30.94 KG/M2 | WEIGHT: 221 LBS | SYSTOLIC BLOOD PRESSURE: 110 MMHG

## 2019-05-08 DIAGNOSIS — R11.10 CHRONIC VOMITING: ICD-10-CM

## 2019-05-08 DIAGNOSIS — L50.8 CHRONIC URTICARIA: Primary | ICD-10-CM

## 2019-05-08 PROCEDURE — 99215 OFFICE O/P EST HI 40 MIN: CPT | Mod: ,,, | Performed by: ALLERGY & IMMUNOLOGY

## 2019-05-08 PROCEDURE — 99215 PR OFFICE/OUTPT VISIT, EST, LEVL V, 40-54 MIN: ICD-10-PCS | Mod: ,,, | Performed by: ALLERGY & IMMUNOLOGY

## 2019-05-08 RX ORDER — CYPROHEPTADINE HYDROCHLORIDE 4 MG/1
4 TABLET ORAL 3 TIMES DAILY PRN
Qty: 90 TABLET | Refills: 1 | Status: SHIPPED | OUTPATIENT
Start: 2019-05-08 | End: 2020-11-11

## 2019-05-08 RX ORDER — CHOLECALCIFEROL (VITAMIN D3) 25 MCG
2000 TABLET ORAL DAILY
COMMUNITY

## 2019-05-08 RX ORDER — LEVOCETIRIZINE DIHYDROCHLORIDE 5 MG/1
5 TABLET, FILM COATED ORAL 2 TIMES DAILY
Qty: 60 TABLET | Refills: 6 | Status: SHIPPED | OUTPATIENT
Start: 2019-05-08 | End: 2020-11-11

## 2019-05-08 NOTE — PROGRESS NOTES
"Subjective:       Patient ID: Wilver Cee Jr. is a 33 y.o. male.    Chief Complaint: Urticaria (still having hives daily x 14 months)    Pt presents for urticaria.     Condition: improved but still has flares daily.   Frequ: daily - mainly at night   Location: generalized   Pt doing well with vitamin D and stopped sweet n low.   Tx: h1 and h2 and prednisone- prn alternativing allegra, claritin, xyzal. pepcid three times per week. Most effective is xyzal.   1 allegra 60 mg, bid. He saves allegra and pepcid for when hives are really bad.   Last week was most recent episode.   Asso: swelling with rash   Onset: march of this year  He has been vomiting recurrently for 7-8 years. Not related per say.   He has been battling tinea on the skin for 5 months. - since treatment at the last visit, on the feet cleared. Groin still an issue however.     Having vomiting episodes frequently. He may vomit until he "gets unconscious"    Pt has cognitive delay and Father remains caregiver.     Pt had normal A1C levels and TSH    Component      Latest Ref Rng & Units 8/9/2018   TSH      0.450 - 4.500 uIU/mL 2.490   Hemoglobin A1C      4.8 - 5.6 % 5.4       Review of Systems      General: neg unexpected weight changes, fevers, chills, night sweats, malaise  HEENT: see hpi, Neg eye pain, vision changes, ear drainage, nose bleeds, throat tightness, sores in the mouth  CV: Neg chest pain, palpitations, swelling  Resp: see hpi, neg shortness of breath, hemoptysis, cough  GI: see hpi, neg dysphagia, night abdominal pain, reflux, chronic diarrhea, chronic constipation  Derm: See Hpi, neg flushing  Mu/sk: Neg joint pain, joint swelling   Psych: Neg anxiety  neuro: neg chronic headaches, muscle weakness  Endo: neg heat/cold intolerance, chronic fatigue    Objective:     Vitals:    05/08/19 1406   BP: 110/62   Weight: 100.2 kg (221 lb)   Height: 5' 11" (1.803 m)        Physical Exam      General: no acute distress, well developed well " nourished - mental cognition impaired. With father   Skin:  No urticarial lesions.       Assessment:       1. Chronic urticaria    2. Chronic vomiting        Plan:       Chronic urticaria  -     cyproheptadine (PERIACTIN) 4 mg tablet; Take 1 tablet (4 mg total) by mouth 3 (three) times daily as needed.  Dispense: 90 tablet; Refill: 1  -     levocetirizine (XYZAL) 5 MG tablet; Take 1 tablet (5 mg total) by mouth 2 (two) times daily.  Dispense: 60 tablet; Refill: 6    Chronic vomiting  -     cyproheptadine (PERIACTIN) 4 mg tablet; Take 1 tablet (4 mg total) by mouth 3 (three) times daily as needed.  Dispense: 90 tablet; Refill: 1      Allegra  mg 1 pill twice per day  Or any antihistamine   pepcid 20 mg 1 pill twice per day if needed   May max out allegra 180 2 pills twice per day.     Dad is more concerned about his cyclic vomiting x 15 years.   Discussed zofran, and pedialyte. Discussed ED visits if vomiting to make sure he isn't volume depleted.   Will see Dr. Oliveira     Continue vitamin D supplementation.     Fungal infection: improved.   nystatin QID     Follow up in 6 months -12 months.     Discussed face to face > 50 % > 40 mins discussing what causes hives, medications, urticaria action plan, testing, and answered all questions.     Reviewed labs- negative inhalant panel, a1c crp tsh normal. cbc and cmp wnl.         Alessandra Martinez M.D.  Allergy/Immunology  [facility]   [phone] phone  [phone] fax

## 2019-05-08 NOTE — PATIENT INSTRUCTIONS
Cyproheptadine- three times per day as needed for vomiting.     Continue current hive regimen.     See what Dr. Oliveira says     May try zofran as soon as vomiting starts instead of waiting.

## 2019-06-10 LAB
ALBUMIN SERPL-MCNC: 4.2 G/DL (ref 3.5–5.5)
ALBUMIN/GLOB SERPL: 1.5 {RATIO} (ref 1.2–2.2)
ALP SERPL-CCNC: 101 IU/L (ref 39–117)
ALT SERPL-CCNC: 12 IU/L (ref 0–44)
APPEARANCE UR: CLEAR
AST SERPL-CCNC: 13 IU/L (ref 0–40)
BACTERIA #/AREA URNS HPF: NORMAL /[HPF]
BASOPHILS # BLD AUTO: 0 X10E3/UL (ref 0–0.2)
BASOPHILS NFR BLD AUTO: 0 %
BILIRUB SERPL-MCNC: 0.4 MG/DL (ref 0–1.2)
BILIRUB UR QL STRIP: NEGATIVE
BUN SERPL-MCNC: 11 MG/DL (ref 6–20)
BUN/CREAT SERPL: 10 (ref 9–20)
CALCIUM SERPL-MCNC: 9.2 MG/DL (ref 8.7–10.2)
CHLORIDE SERPL-SCNC: 104 MMOL/L (ref 96–106)
CO2 SERPL-SCNC: 25 MMOL/L (ref 20–29)
COLOR UR: YELLOW
CREAT SERPL-MCNC: 1.05 MG/DL (ref 0.76–1.27)
EOSINOPHIL # BLD AUTO: 0.1 X10E3/UL (ref 0–0.4)
EOSINOPHIL NFR BLD AUTO: 1 %
EPI CELLS #/AREA URNS HPF: NORMAL /HPF (ref 0–10)
ERYTHROCYTE [DISTWIDTH] IN BLOOD BY AUTOMATED COUNT: 14 % (ref 12.3–15.4)
GLOBULIN SER CALC-MCNC: 2.8 G/DL (ref 1.5–4.5)
GLUCOSE SERPL-MCNC: 95 MG/DL (ref 65–99)
GLUCOSE UR QL: NEGATIVE
HCT VFR BLD AUTO: 45.5 % (ref 37.5–51)
HGB BLD-MCNC: 15 G/DL (ref 13–17.7)
HGB UR QL STRIP: NEGATIVE
IMM GRANULOCYTES # BLD AUTO: 0 X10E3/UL (ref 0–0.1)
IMM GRANULOCYTES NFR BLD AUTO: 0 %
KETONES UR QL STRIP: NEGATIVE
LEUKOCYTE ESTERASE UR QL STRIP: NEGATIVE
LYMPHOCYTES # BLD AUTO: 1.3 X10E3/UL (ref 0.7–3.1)
LYMPHOCYTES NFR BLD AUTO: 21 %
MCH RBC QN AUTO: 29.6 PG (ref 26.6–33)
MCHC RBC AUTO-ENTMCNC: 33 G/DL (ref 31.5–35.7)
MCV RBC AUTO: 90 FL (ref 79–97)
MICRO URNS: NORMAL
MICRO URNS: NORMAL
MONOCYTES # BLD AUTO: 0.3 X10E3/UL (ref 0.1–0.9)
MONOCYTES NFR BLD AUTO: 4 %
NEUTROPHILS # BLD AUTO: 4.7 X10E3/UL (ref 1.4–7)
NEUTROPHILS NFR BLD AUTO: 74 %
NITRITE UR QL STRIP: NEGATIVE
PH UR STRIP: 7.5 [PH] (ref 5–7.5)
PLATELET # BLD AUTO: 125 X10E3/UL (ref 150–450)
POTASSIUM SERPL-SCNC: 4.4 MMOL/L (ref 3.5–5.2)
PROT SERPL-MCNC: 7 G/DL (ref 6–8.5)
PROT UR QL STRIP: NEGATIVE
RBC # BLD AUTO: 5.06 X10E6/UL (ref 4.14–5.8)
RBC #/AREA URNS HPF: NORMAL /HPF (ref 0–2)
SODIUM SERPL-SCNC: 143 MMOL/L (ref 134–144)
SP GR UR: 1.01 (ref 1–1.03)
THYROGLOB AB SERPL-ACNC: <1 IU/ML (ref 0–0.9)
THYROPEROXIDASE AB SERPL-ACNC: 12 IU/ML (ref 0–34)
URINALYSIS REFLEX: NORMAL
UROBILINOGEN UR STRIP-MCNC: 0.2 MG/DL (ref 0.2–1)
WBC # BLD AUTO: 6.4 X10E3/UL (ref 3.4–10.8)
WBC #/AREA URNS HPF: NORMAL /HPF (ref 0–5)

## 2019-09-18 ENCOUNTER — OFFICE VISIT (OUTPATIENT)
Dept: RHEUMATOLOGY | Facility: CLINIC | Age: 34
End: 2019-09-18
Payer: MEDICARE

## 2019-09-18 VITALS
DIASTOLIC BLOOD PRESSURE: 83 MMHG | WEIGHT: 215.69 LBS | SYSTOLIC BLOOD PRESSURE: 124 MMHG | BODY MASS INDEX: 30.08 KG/M2

## 2019-09-18 DIAGNOSIS — R76.8 POSITIVE ANA (ANTINUCLEAR ANTIBODY): Primary | ICD-10-CM

## 2019-09-18 PROCEDURE — 99213 PR OFFICE/OUTPT VISIT, EST, LEVL III, 20-29 MIN: ICD-10-PCS | Mod: S$GLB,,, | Performed by: INTERNAL MEDICINE

## 2019-09-18 PROCEDURE — 99213 OFFICE O/P EST LOW 20 MIN: CPT | Mod: S$GLB,,, | Performed by: INTERNAL MEDICINE

## 2019-09-18 RX ORDER — PANTOPRAZOLE SODIUM 40 MG/1
40 TABLET, DELAYED RELEASE ORAL DAILY
Refills: 11 | COMMUNITY
Start: 2019-09-07 | End: 2020-01-15

## 2019-09-18 NOTE — PROGRESS NOTES
[facility] RHEUMATOLOGY            PROGRESS NOTE      Subjective:       Patient ID:   NAME: Wilver Cee Jr. : 1985     34 y.o. male    Referring Doc: No ref. provider found  Other Physicians:    Chief Complaint:  Positive JOSH      History of Present Illness:     Patient returns today for a regularly scheduled follow-up visit for  positive JOSH(1:320)     His father states he continues with occasional episodes of vomiting and or tick area.  No apparent fevers cough.  No joint swelling.  No apparent mucosal ulcerations.            ROS:   GEN:  No  fever, night sweats . weight is stable    SKIN: no rashes except for generalized chronic urticaria, no bruising, no ulcerations, no Raynaud's  HEENT: no  Complaints of HA's,, no mucosal ulcers,   CV:   no CP, SOB, PND, CRUZ, no orthopnea,  PULM: normal with no, cough, hemoptysis, sputum or pleuritic pain  GI:  no abdominal pain, nausea, vomiting, constipation, diarrhea, melanotic stools, BRBPR, hematemesis, no dysphagia  :   no dysuria  NEURO: no muscle weakness  MUSCULOSKELETAL:no joint swelling, No apparent  prolonged AM stiffness, no complaints of  back pain or  muscle pain  Allergies:  Review of patient's allergies indicates:  No Known Allergies    Medications:    Current Outpatient Medications:     clotrimazole (LOTRIMIN) 1 % Soln, Apply topically 2 (two) times daily., Disp: , Rfl:     cyproheptadine (PERIACTIN) 4 mg tablet, Take 1 tablet (4 mg total) by mouth 3 (three) times daily as needed., Disp: 90 tablet, Rfl: 1    diphenhydrAMINE (SOMINEX) 25 mg tablet, Take 25 mg by mouth nightly as needed for Insomnia., Disp: , Rfl:     famotidine (PEPCID ORAL), Take by mouth., Disp: , Rfl:     fexofenadine (ALLEGRA) 60 MG tablet, Take 1 tablet (60 mg total) by mouth 2 (two) times daily., Disp: 60 tablet, Rfl: 3    levocetirizine (XYZAL) 5 MG tablet, Take 1 tablet (5 mg total) by mouth 2 (two) times daily., Disp: 60 tablet, Rfl: 6    loratadine (CLARITIN)  10 mg tablet, Take 10 mg by mouth daily as needed for Allergies., Disp: , Rfl:     NYSTOP powder, 2 (two) times daily. Apply to affected area, Disp: , Rfl: 1    ondansetron (ZOFRAN) 4 mg/5 mL solution, 5 ML BY MOUTH THREE TIMES A DAY AS NEEDED, Disp: , Rfl: 1    pantoprazole (PROTONIX) 40 MG tablet, Take 40 mg by mouth once daily., Disp: , Rfl: 11    vitamin D (VITAMIN D3) 1000 units Tab, Take 2,000 Units by mouth once daily., Disp: , Rfl:     PMHx/PSHx Updates:          Objective:     Vitals:  Blood pressure 124/83, weight 97.8 kg (215 lb 11.2 oz).    Physical Examination:   GEN: no apparent distress, comfortable; AAOx3  SKIN: no rashes,no ulceration, no Raynaud's, no petechiae, no SQ nodules,  HEAD: normal  EYES: no pallor, no icterus,   NECK: no masses, thyroid normal, trachea midline, no LAD/LN's, supple  CV: RRR with no murmur; l S1 and S2 reg. ,no gallop no rubs,   CHEST: Normal respiratory effort; CTAB; normal breath sounds; no wheeze or crackles  MUSC/Skeletal: ROM normal; no crepitus; joints without synovitis,  no deformities  No joint swelling or tenderness of PIP, MCP, wrist, elbow, shoulder, or knee joints  EXTREM: no clubbing, cyanosis, no edema,normal  pulses   NEURO: grossly intact; motor WNL;  LYMPH: normal cervical, supraclavicular          Labs:   Lab Results   Component Value Date    WBC 6.4 06/07/2019    HGB 15.0 06/07/2019    HCT 45.5 06/07/2019    MCV 90 06/07/2019     (L) 06/07/2019    CMP  @LASTLAB(NA,K,CL,CO2,GLU,BUN,Creatinine,Calcium,PROT,Albumin,Bilitot,Alkphos,AST,ALT,CRP,ESR,RF,CCP,JOSH,SSA,CPK,uric acid) )@  I have reviewed all available lab results and radiology reports.    Radiology/Diagnostic Studies:        Assessment/Plan:   (1) 34 y.o. male with diagnosis of positive JOSH.( 1:320) No other sxs or signs of CTD  2)Hx chronic urticaria  Most recent blood work in a few months ago showed very mild thrombocytopenia.    CBC CMP urinalysis and complement  levels          Discussion:     I have explained all of the above in detail and the patient understands all of the current recommendation(s). I have answered all questions to the best of my ability and to their complete satisfaction.       The patient is to continue with the current management plan         RTC in         Electronically signed by Saji Eason MD

## 2019-10-24 LAB
ALBUMIN SERPL-MCNC: 4.4 G/DL (ref 3.5–5.5)
ALBUMIN/GLOB SERPL: 1.7 {RATIO} (ref 1.2–2.2)
ALP SERPL-CCNC: 99 IU/L (ref 39–117)
ALT SERPL-CCNC: 11 IU/L (ref 0–44)
APPEARANCE UR: CLEAR
AST SERPL-CCNC: 15 IU/L (ref 0–40)
BACTERIA #/AREA URNS HPF: NORMAL /[HPF]
BASOPHILS # BLD AUTO: 0 X10E3/UL (ref 0–0.2)
BASOPHILS NFR BLD AUTO: 0 %
BILIRUB SERPL-MCNC: 0.4 MG/DL (ref 0–1.2)
BILIRUB UR QL STRIP: NEGATIVE
BUN SERPL-MCNC: 14 MG/DL (ref 6–20)
BUN/CREAT SERPL: 13 (ref 9–20)
C3 SERPL-MCNC: 114 MG/DL (ref 82–167)
C4 SERPL-MCNC: 40 MG/DL (ref 14–44)
CALCIUM SERPL-MCNC: 9.2 MG/DL (ref 8.7–10.2)
CHLORIDE SERPL-SCNC: 103 MMOL/L (ref 96–106)
CO2 SERPL-SCNC: 25 MMOL/L (ref 20–29)
COLOR UR: YELLOW
CREAT SERPL-MCNC: 1.1 MG/DL (ref 0.76–1.27)
EOSINOPHIL # BLD AUTO: 0.1 X10E3/UL (ref 0–0.4)
EOSINOPHIL NFR BLD AUTO: 1 %
EPI CELLS #/AREA URNS HPF: NORMAL /HPF (ref 0–10)
ERYTHROCYTE [DISTWIDTH] IN BLOOD BY AUTOMATED COUNT: 13.8 % (ref 12.3–15.4)
GLOBULIN SER CALC-MCNC: 2.6 G/DL (ref 1.5–4.5)
GLUCOSE SERPL-MCNC: 100 MG/DL (ref 65–99)
GLUCOSE UR QL: NEGATIVE
HCT VFR BLD AUTO: 48.3 % (ref 37.5–51)
HGB BLD-MCNC: 15.7 G/DL (ref 13–17.7)
HGB UR QL STRIP: NEGATIVE
IMM GRANULOCYTES # BLD AUTO: 0 X10E3/UL (ref 0–0.1)
IMM GRANULOCYTES NFR BLD AUTO: 0 %
KETONES UR QL STRIP: NEGATIVE
LEUKOCYTE ESTERASE UR QL STRIP: NEGATIVE
LYMPHOCYTES # BLD AUTO: 1.7 X10E3/UL (ref 0.7–3.1)
LYMPHOCYTES NFR BLD AUTO: 28 %
MCH RBC QN AUTO: 30.1 PG (ref 26.6–33)
MCHC RBC AUTO-ENTMCNC: 32.5 G/DL (ref 31.5–35.7)
MCV RBC AUTO: 93 FL (ref 79–97)
MICRO URNS: NORMAL
MICRO URNS: NORMAL
MONOCYTES # BLD AUTO: 0.2 X10E3/UL (ref 0.1–0.9)
MONOCYTES NFR BLD AUTO: 4 %
MUCOUS THREADS URNS QL MICRO: PRESENT
NEUTROPHILS # BLD AUTO: 4.1 X10E3/UL (ref 1.4–7)
NEUTROPHILS NFR BLD AUTO: 67 %
NITRITE UR QL STRIP: NEGATIVE
PH UR STRIP: 5.5 [PH] (ref 5–7.5)
PLATELET # BLD AUTO: 116 X10E3/UL (ref 150–450)
POTASSIUM SERPL-SCNC: 4.2 MMOL/L (ref 3.5–5.2)
PROT SERPL-MCNC: 7 G/DL (ref 6–8.5)
PROT UR QL STRIP: NEGATIVE
RBC # BLD AUTO: 5.22 X10E6/UL (ref 4.14–5.8)
RBC #/AREA URNS HPF: NORMAL /HPF (ref 0–2)
SODIUM SERPL-SCNC: 143 MMOL/L (ref 134–144)
SP GR UR: 1.02 (ref 1–1.03)
UROBILINOGEN UR STRIP-MCNC: 0.2 MG/DL (ref 0.2–1)
WBC # BLD AUTO: 6.1 X10E3/UL (ref 3.4–10.8)
WBC #/AREA URNS HPF: NORMAL /HPF (ref 0–5)

## 2019-11-06 ENCOUNTER — OFFICE VISIT (OUTPATIENT)
Dept: ALLERGY | Facility: CLINIC | Age: 34
End: 2019-11-06
Payer: MEDICARE

## 2019-11-06 VITALS
BODY MASS INDEX: 30.1 KG/M2 | SYSTOLIC BLOOD PRESSURE: 124 MMHG | DIASTOLIC BLOOD PRESSURE: 76 MMHG | HEIGHT: 71 IN | WEIGHT: 215 LBS

## 2019-11-06 DIAGNOSIS — L50.8 CHRONIC URTICARIA: Primary | ICD-10-CM

## 2019-11-06 PROCEDURE — 99214 OFFICE O/P EST MOD 30 MIN: CPT | Mod: S$GLB,,, | Performed by: ALLERGY & IMMUNOLOGY

## 2019-11-06 PROCEDURE — 99214 PR OFFICE/OUTPT VISIT, EST, LEVL IV, 30-39 MIN: ICD-10-PCS | Mod: S$GLB,,, | Performed by: ALLERGY & IMMUNOLOGY

## 2019-11-06 NOTE — PROGRESS NOTES
"Subjective:       Patient ID: Wilver Cee Jr. is a 34 y.o. male.    Chief Complaint: Urticaria (can be controlled but still present )    Pt presents for urticaria.     Condition: improved but still has flares daily.   Frequ: daily - mainly at night   Allegra in the morning and claritin at night with prn pepcid AC helps take away urticaria.   Location: generalized   Pt doing well with vitamin D and stopped sweet n low.   Tx: h1 and h2 and prednisone- prn alternativing allegra, claritin, xyzal. pepcid three times per week. Most effective is xyzal.   1 allegra 60 mg, bid. He saves allegra and pepcid for when hives are really bad.   xyzal makes him too drowsy.   Last week was most recent episode.   Asso: swelling with rash   Onset: march of this year  He has been vomiting recurrently for 7-8 years. Not related per say.   He has been battling tinea on the skin for 5 months. - since treatment at the last visit, on the feet cleared. Groin still an issue however.     Having vomiting episodes frequently. He may vomit until he "gets unconscious"    Pt has cognitive delay and Father remains caregiver.     Pt had normal A1C levels and TSH    Component      Latest Ref Rng & Units 8/9/2018   TSH      0.450 - 4.500 uIU/mL 2.490   Hemoglobin A1C      4.8 - 5.6 % 5.4       Review of Systems      General: neg unexpected weight changes, fevers, chills, night sweats, malaise  HEENT: see hpi, Neg eye pain, vision changes, ear drainage, nose bleeds, throat tightness, sores in the mouth  CV: Neg chest pain, palpitations, swelling  Resp: see hpi, neg shortness of breath, hemoptysis, cough  GI: see hpi, neg dysphagia, night abdominal pain, reflux, chronic diarrhea, chronic constipation  Derm: See Hpi, neg flushing  Mu/sk: Neg joint pain, joint swelling   Psych: Neg anxiety  neuro: neg chronic headaches, muscle weakness  Endo: neg heat/cold intolerance, chronic fatigue    Objective:     Vitals:    11/06/19 1435   BP: 124/76   Weight: " "97.5 kg (215 lb)   Height: 5' 11" (1.803 m)        Physical Exam      General: no acute distress, well developed well nourished - mental cognition impaired. With father   Skin: urticarial lesions on bilateral arms and legs.       Assessment:       1. Chronic urticaria        Plan:       Chronic urticaria      Allegra  mg 1 pill twice per day  Or any antihistamine   pepcid 20 mg 1 pill twice per day if needed   May max out allegra 180 2 pills twice per day.     Dad is more concerned about his cyclic vomiting x 15 years.   Discussed zofran, and pedialyte. Discussed ED visits if vomiting to make sure he isn't volume depleted.   Will see Dr. Oliveira   Now on ppi and doing better.     Continue vitamin D supplementation.     Fungal infection: improved.   nystatin QID     Follow up in 6 months -12 months.     Discussed face to face > 50 % > 35 mins discussing what causes hives, medications, urticaria action plan, testing, and answered all questions.     Reviewed labs- negative inhalant panel, recent labs reviewed with nicole and wnl.         Alessandra Martinez M.D.  Allergy/Immunology  [facility]   [phone] phone  [phone] fax            "

## 2020-01-15 ENCOUNTER — OFFICE VISIT (OUTPATIENT)
Dept: RHEUMATOLOGY | Facility: CLINIC | Age: 35
End: 2020-01-15
Payer: MEDICARE

## 2020-01-15 VITALS
SYSTOLIC BLOOD PRESSURE: 133 MMHG | WEIGHT: 210.88 LBS | DIASTOLIC BLOOD PRESSURE: 82 MMHG | BODY MASS INDEX: 29.41 KG/M2

## 2020-01-15 DIAGNOSIS — E55.9 HYPOVITAMINOSIS D: ICD-10-CM

## 2020-01-15 DIAGNOSIS — R76.8 POSITIVE ANA (ANTINUCLEAR ANTIBODY): Primary | ICD-10-CM

## 2020-01-15 PROCEDURE — 99213 PR OFFICE/OUTPT VISIT, EST, LEVL III, 20-29 MIN: ICD-10-PCS | Mod: S$GLB,,, | Performed by: INTERNAL MEDICINE

## 2020-01-15 PROCEDURE — 99213 OFFICE O/P EST LOW 20 MIN: CPT | Mod: S$GLB,,, | Performed by: INTERNAL MEDICINE

## 2020-01-15 RX ORDER — DICLOFENAC SODIUM 10 MG/G
GEL TOPICAL
Qty: 1 TUBE | Refills: 2 | Status: SHIPPED | OUTPATIENT
Start: 2020-01-15 | End: 2022-06-29

## 2020-01-15 NOTE — PROGRESS NOTES
[facility] RHEUMATOLOGY            PROGRESS NOTE      Subjective:       Patient ID:   NAME: Wilver Cee Jr. : 1985     34 y.o. male    Referring Doc: No ref. provider found  Other Physicians:    Chief Complaint:  Positive JOSH      History of Present Illness:     Patient returns today for a regularly scheduled follow-up visit for positive JOSH      The patient apparently has been doing well according to his father.  He has cyclic vomiting severe vomiting for a few hours every few months.  He sees GI.  No apparent chest pains.  Father has noticed sometimes raising his left arm is slightly limited.  No swelling or history of trauma            ROS:   GEN:  No  fever, night sweats . weight is stable   No fatigue  SKIN:+ hives, no bruising, no ulcerations, no Raynaud's  HEENT: no HA's,  no mucosal ulcers,   PULM: no cough, hemoptysis, sputum   GI:  no abdominal pain + cyclic vomiting, constipation, diarrhea, melanotic stools, BRBPR, hematemesi  :   no dysuria  MUSCULOSKELETAL:no joint swelling,   Allergies:  Review of patient's allergies indicates:  No Known Allergies    Medications:    Current Outpatient Medications:     clotrimazole (LOTRIMIN) 1 % Soln, Apply topically 2 (two) times daily., Disp: , Rfl:     cyproheptadine (PERIACTIN) 4 mg tablet, Take 1 tablet (4 mg total) by mouth 3 (three) times daily as needed., Disp: 90 tablet, Rfl: 1    diphenhydrAMINE (SOMINEX) 25 mg tablet, Take 25 mg by mouth nightly as needed for Insomnia., Disp: , Rfl:     famotidine (PEPCID ORAL), Take by mouth., Disp: , Rfl:     fexofenadine (ALLEGRA) 60 MG tablet, Take 1 tablet (60 mg total) by mouth 2 (two) times daily., Disp: 60 tablet, Rfl: 3    levocetirizine (XYZAL) 5 MG tablet, Take 1 tablet (5 mg total) by mouth 2 (two) times daily., Disp: 60 tablet, Rfl: 6    loratadine (CLARITIN) 10 mg tablet, Take 10 mg by mouth daily as needed for Allergies., Disp: , Rfl:     NYSTOP powder, 2 (two) times daily. Apply to  affected area, Disp: , Rfl: 1    ondansetron (ZOFRAN) 4 mg/5 mL solution, 5 ML BY MOUTH THREE TIMES A DAY AS NEEDED, Disp: , Rfl: 1    vitamin D (VITAMIN D3) 1000 units Tab, Take 2,000 Units by mouth once daily., Disp: , Rfl:     diclofenac sodium (VOLTAREN) 1 % Gel, 2 gm on affected shoulder tid prn, Disp: 1 Tube, Rfl: 2    PMHx/PSHx Updates:        Objective:     Vitals:  Blood pressure 133/82, weight 95.7 kg (210 lb 14.4 oz).    Physical Examination:   GEN: no apparent distress, comfortable; AAOx3  SKIN: no rashes,no ulceration, no Raynaud's, no petechiae, no SQ nodules,  HEAD: normal  EYES: no pallor, no icterus  NECK: no masses, thyroid normal, trachea midline, no LAD/LN's, supple  CV: RRR with no murmur; l S1 and S2 reg. ,no gallop no rubs,   CHEST: Normal respiratory effort; CTAB; normal breath sounds; no wheeze or crackles  MUSC/Skeletal: ROM normal; no crepitus; joints without synovitis,  no deformities  No joint swelling or tenderness of PIP, MCP, wrist, elbow, shoulder, or knee joints.  Left shoulder without swelling.  ? Sl limited abduction   EXTREM: no clubbing, cyanosis, no edema,normal  pulses   NEURO: grossly intact; motor WNl  LYMPH: normal cervical, supraclavicular          Labs:   Lab Results   Component Value Date    WBC 6.1 10/23/2019    HGB 15.7 10/23/2019    HCT 48.3 10/23/2019    MCV 93 10/23/2019     (L) 10/23/2019    CMP  @LASTLAB(NA,K,CL,CO2,GLU,BUN,Creatinine,Calcium,PROT,Albumin,Bilitot,Alkphos,AST,ALT,CRP,ESR,RF,CCP,JOSH,SSA,CPK,uric acid) )@  I have reviewed all available lab results and radiology reports.    Radiology/Diagnostic Studies:        Assessment/Plan:   (1) 34 y.o. male with diagnosis of positive JOSH.  He seems stable.  Slight thrombocytopenia  Probably some bursitis left shoulder.  Voltaren gel as needed explained to his down to use 2 g twice a day or 3 times a day if it looks like he has experiencing pain in the shoulder        CBC CMP urinalysis will repeat JOSH.   He will need this test within the next few weeks    Discussion:     I have explained all of the above in detail and the patient understands all of the current recommendation(s). I have answered all questions to the best of my ability and to their complete satisfaction.       The patient is to continue with the current management plan         RTC in  4 months       Electronically signed by Saji Eason MD

## 2020-01-31 LAB
25(OH)D3+25(OH)D2 SERPL-MCNC: 34.1 NG/ML (ref 30–100)
ALBUMIN SERPL-MCNC: 4.2 G/DL (ref 4–5)
ALBUMIN/GLOB SERPL: 1.6 {RATIO} (ref 1.2–2.2)
ALP SERPL-CCNC: 111 IU/L (ref 39–117)
ALT SERPL-CCNC: 12 IU/L (ref 0–44)
ANA SPECKLED TITR SER: ABNORMAL {TITER}
ANA TITR SER IF: POSITIVE {TITER}
APPEARANCE UR: CLEAR
AST SERPL-CCNC: 18 IU/L (ref 0–40)
BASOPHILS # BLD AUTO: 0 X10E3/UL (ref 0–0.2)
BASOPHILS NFR BLD AUTO: 0 %
BILIRUB SERPL-MCNC: 0.5 MG/DL (ref 0–1.2)
BILIRUB UR QL STRIP: NEGATIVE
BUN SERPL-MCNC: 16 MG/DL (ref 6–20)
BUN/CREAT SERPL: 17 (ref 9–20)
C3 SERPL-MCNC: 121 MG/DL (ref 82–167)
C4 SERPL-MCNC: 44 MG/DL (ref 14–44)
CALCIUM SERPL-MCNC: 9.1 MG/DL (ref 8.7–10.2)
CHLORIDE SERPL-SCNC: 104 MMOL/L (ref 96–106)
CO2 SERPL-SCNC: 23 MMOL/L (ref 20–29)
COLOR UR: YELLOW
CREAT SERPL-MCNC: 0.93 MG/DL (ref 0.76–1.27)
EOSINOPHIL # BLD AUTO: 0 X10E3/UL (ref 0–0.4)
EOSINOPHIL NFR BLD AUTO: 1 %
ERYTHROCYTE [DISTWIDTH] IN BLOOD BY AUTOMATED COUNT: 13.3 % (ref 11.6–15.4)
GLOBULIN SER CALC-MCNC: 2.6 G/DL (ref 1.5–4.5)
GLUCOSE SERPL-MCNC: 102 MG/DL (ref 65–99)
GLUCOSE UR QL: NEGATIVE
HCT VFR BLD AUTO: 46.4 % (ref 37.5–51)
HGB BLD-MCNC: 15.3 G/DL (ref 13–17.7)
HGB UR QL STRIP: NEGATIVE
IMM GRANULOCYTES # BLD AUTO: 0 X10E3/UL (ref 0–0.1)
IMM GRANULOCYTES NFR BLD AUTO: 0 %
KETONES UR QL STRIP: NEGATIVE
LEUKOCYTE ESTERASE UR QL STRIP: NEGATIVE
LYMPHOCYTES # BLD AUTO: 1.4 X10E3/UL (ref 0.7–3.1)
LYMPHOCYTES NFR BLD AUTO: 19 %
MCH RBC QN AUTO: 30.1 PG (ref 26.6–33)
MCHC RBC AUTO-ENTMCNC: 33 G/DL (ref 31.5–35.7)
MCV RBC AUTO: 91 FL (ref 79–97)
MICRO URNS: NORMAL
MONOCYTES # BLD AUTO: 0.4 X10E3/UL (ref 0.1–0.9)
MONOCYTES NFR BLD AUTO: 5 %
NEUTROPHILS # BLD AUTO: 5.6 X10E3/UL (ref 1.4–7)
NEUTROPHILS NFR BLD AUTO: 75 %
NITRITE UR QL STRIP: NEGATIVE
NOTE: ABNORMAL
PH UR STRIP: 5 [PH] (ref 5–7.5)
PLATELET # BLD AUTO: 139 X10E3/UL (ref 150–450)
POTASSIUM SERPL-SCNC: 4.1 MMOL/L (ref 3.5–5.2)
PROT SERPL-MCNC: 6.8 G/DL (ref 6–8.5)
PROT UR QL STRIP: NEGATIVE
RBC # BLD AUTO: 5.08 X10E6/UL (ref 4.14–5.8)
SODIUM SERPL-SCNC: 141 MMOL/L (ref 134–144)
SP GR UR: 1.03 (ref 1–1.03)
UROBILINOGEN UR STRIP-MCNC: 0.2 MG/DL (ref 0.2–1)
WBC # BLD AUTO: 7.4 X10E3/UL (ref 3.4–10.8)

## 2020-07-08 ENCOUNTER — OFFICE VISIT (OUTPATIENT)
Dept: RHEUMATOLOGY | Facility: CLINIC | Age: 35
End: 2020-07-08
Payer: MEDICARE

## 2020-07-08 VITALS
TEMPERATURE: 98 F | BODY MASS INDEX: 29.23 KG/M2 | WEIGHT: 209.63 LBS | DIASTOLIC BLOOD PRESSURE: 81 MMHG | SYSTOLIC BLOOD PRESSURE: 118 MMHG

## 2020-07-08 DIAGNOSIS — R76.8 POSITIVE ANA (ANTINUCLEAR ANTIBODY): Primary | ICD-10-CM

## 2020-07-08 DIAGNOSIS — L50.8 CHRONIC URTICARIA: ICD-10-CM

## 2020-07-08 PROCEDURE — 99213 PR OFFICE/OUTPT VISIT, EST, LEVL III, 20-29 MIN: ICD-10-PCS | Mod: S$GLB,,, | Performed by: INTERNAL MEDICINE

## 2020-07-08 PROCEDURE — 99213 OFFICE O/P EST LOW 20 MIN: CPT | Mod: S$GLB,,, | Performed by: INTERNAL MEDICINE

## 2020-07-08 NOTE — PROGRESS NOTES
[facility] RHEUMATOLOGY            PROGRESS NOTE      Subjective:       Patient ID:   NAME: Wilver Cee Jr. : 1985     35 y.o. male    Referring Doc: No ref. provider found  Other Physicians:    Chief Complaint:  Positive JOSH      History of Present Illness:     Patient returns today for a regularly scheduled follow-up visit for   positive JOSH    The patient continues with episodes of hives.  Patient since father states he had severe breakout after eating fish at a restaurant.  No fevers.  No cough.  No mucosal ulcerations as far as father can tell.  No joint swelling.            ROS:   GEN:  No  fevers  SKIN: no rashes, no bruising, no ulcerations, no Raynaud's  HEENT:  no mucosal ulcer  PULM: normal with no cough, hemoptysis, sputum   GI:  no , vomiting, constipation, diarrhea, melanotic stools, BRBPR, hematemesis,  MUSCULOSKELETAL:no joint swelling, prolonged AM stiffness, no back pain,  Allergies:  Review of patient's allergies indicates:  No Known Allergies    Medications:    Current Outpatient Medications:     clotrimazole (LOTRIMIN) 1 % Soln, Apply topically 2 (two) times daily., Disp: , Rfl:     cyproheptadine (PERIACTIN) 4 mg tablet, Take 1 tablet (4 mg total) by mouth 3 (three) times daily as needed., Disp: 90 tablet, Rfl: 1    diclofenac sodium (VOLTAREN) 1 % Gel, 2 gm on affected shoulder tid prn, Disp: 1 Tube, Rfl: 2    diphenhydrAMINE (SOMINEX) 25 mg tablet, Take 25 mg by mouth nightly as needed for Insomnia., Disp: , Rfl:     famotidine (PEPCID ORAL), Take by mouth., Disp: , Rfl:     fexofenadine (ALLEGRA) 60 MG tablet, Take 1 tablet (60 mg total) by mouth 2 (two) times daily., Disp: 60 tablet, Rfl: 3    levocetirizine (XYZAL) 5 MG tablet, Take 1 tablet (5 mg total) by mouth 2 (two) times daily., Disp: 60 tablet, Rfl: 6    loratadine (CLARITIN) 10 mg tablet, Take 10 mg by mouth daily as needed for Allergies., Disp: , Rfl:     NYSTOP powder, 2 (two) times daily. Apply to  affected area, Disp: , Rfl: 1    ondansetron (ZOFRAN) 4 mg/5 mL solution, 5 ML BY MOUTH THREE TIMES A DAY AS NEEDED, Disp: , Rfl: 1    vitamin D (VITAMIN D3) 1000 units Tab, Take 2,000 Units by mouth once daily., Disp: , Rfl:     PMHx/PSHx Updates:        Objective:     Vitals:  Blood pressure 118/81, temperature 97.6 °F (36.4 °C), weight 95.1 kg (209 lb 9.6 oz).    Physical Examination:   GEN: no apparent distress, comfortable; AAOx3  SKIN: + scattered or tick area lesions on arms and some in legs.  rashes,no ulceration, no Raynaud's, no petechiae, no SQ nodules,  HEAD: normal  EYES: no pallor, no icterus,   NECK: no masses, thyroid normal, trachea midline, no LAD/LN's, supple  CV: RRR with no murmur; l S1 and S2 reg. ,no gallop no rubs,   CHEST: Normal respiratory effort; CTAB; normal breath sounds; no wheeze or crackles  MUSC/Skeletal: ROM normal; no crepitus; joints without synovitis,  no deformities  No joint swelling or tenderness of PIP, MCP, wrist, elbow, shoulder, or knee joints  EXTREM: no clubbing, cyanosis, no edema,  NEURO: grossly intact; motor WNL; AAOx3;   PSYCH: normal mood, affect and behavior  LYMPH: normal cervical, supraclavicular          Labs:   Lab Results   Component Value Date    WBC 7.4 01/30/2020    HGB 15.3 01/30/2020    HCT 46.4 01/30/2020    MCV 91 01/30/2020     (L) 01/30/2020    CMP  @LASTLAB(NA,K,CL,CO2,GLU,BUN,Creatinine,Calcium,PROT,Albumin,Bilitot,Alkphos,AST,ALT,CRP,ESR,RF,CCP,JOSH,SSA,CPK,uric acid) )@  I have reviewed all available lab results and radiology reports.    Radiology/Diagnostic Studies:        Assessment/Plan:   (1) 35 y.o. male with diagnosis of positive JOSH.  Chronic urticaria area.  Plan CBC CMP urinalysis            Discussion:     I have explained all of the above in detail and the patient understands all of the current recommendation(s). I have answered all questions to the best of my ability and to their complete satisfaction.       The patient is to  continue with the current management plan         RTC in   4-6 months or before if needed      Electronically signed by Saji Eason MD

## 2020-07-10 LAB
ALBUMIN SERPL-MCNC: 4.4 G/DL (ref 4–5)
ALBUMIN/GLOB SERPL: 2 {RATIO} (ref 1.2–2.2)
ALP SERPL-CCNC: 119 IU/L (ref 39–117)
ALT SERPL-CCNC: 16 IU/L (ref 0–44)
APPEARANCE UR: CLEAR
AST SERPL-CCNC: 17 IU/L (ref 0–40)
BASOPHILS # BLD AUTO: 0 X10E3/UL (ref 0–0.2)
BASOPHILS NFR BLD AUTO: 0 %
BILIRUB SERPL-MCNC: 0.3 MG/DL (ref 0–1.2)
BILIRUB UR QL STRIP: NEGATIVE
BUN SERPL-MCNC: 15 MG/DL (ref 6–20)
BUN/CREAT SERPL: 14 (ref 9–20)
CALCIUM SERPL-MCNC: 9.1 MG/DL (ref 8.7–10.2)
CHLORIDE SERPL-SCNC: 104 MMOL/L (ref 96–106)
CO2 SERPL-SCNC: 26 MMOL/L (ref 20–29)
COLOR UR: YELLOW
CREAT SERPL-MCNC: 1.06 MG/DL (ref 0.76–1.27)
EOSINOPHIL # BLD AUTO: 0.1 X10E3/UL (ref 0–0.4)
EOSINOPHIL NFR BLD AUTO: 1 %
ERYTHROCYTE [DISTWIDTH] IN BLOOD BY AUTOMATED COUNT: 12.9 % (ref 11.6–15.4)
GLOBULIN SER CALC-MCNC: 2.2 G/DL (ref 1.5–4.5)
GLUCOSE SERPL-MCNC: 77 MG/DL (ref 65–99)
GLUCOSE UR QL: NEGATIVE
HCT VFR BLD AUTO: 44.7 % (ref 37.5–51)
HGB BLD-MCNC: 15 G/DL (ref 13–17.7)
HGB UR QL STRIP: NEGATIVE
IMM GRANULOCYTES # BLD AUTO: 0 X10E3/UL (ref 0–0.1)
IMM GRANULOCYTES NFR BLD AUTO: 0 %
KETONES UR QL STRIP: NEGATIVE
LEUKOCYTE ESTERASE UR QL STRIP: NEGATIVE
LYMPHOCYTES # BLD AUTO: 1.5 X10E3/UL (ref 0.7–3.1)
LYMPHOCYTES NFR BLD AUTO: 25 %
MCH RBC QN AUTO: 30.3 PG (ref 26.6–33)
MCHC RBC AUTO-ENTMCNC: 33.6 G/DL (ref 31.5–35.7)
MCV RBC AUTO: 90 FL (ref 79–97)
MICRO URNS: NORMAL
MONOCYTES # BLD AUTO: 0.3 X10E3/UL (ref 0.1–0.9)
MONOCYTES NFR BLD AUTO: 5 %
NEUTROPHILS # BLD AUTO: 4.1 X10E3/UL (ref 1.4–7)
NEUTROPHILS NFR BLD AUTO: 69 %
NITRITE UR QL STRIP: NEGATIVE
PH UR STRIP: 5 [PH] (ref 5–7.5)
PLATELET # BLD AUTO: 120 X10E3/UL (ref 150–450)
POTASSIUM SERPL-SCNC: 4.3 MMOL/L (ref 3.5–5.2)
PROT SERPL-MCNC: 6.6 G/DL (ref 6–8.5)
PROT UR QL STRIP: NEGATIVE
RBC # BLD AUTO: 4.95 X10E6/UL (ref 4.14–5.8)
SODIUM SERPL-SCNC: 144 MMOL/L (ref 134–144)
SP GR UR: 1.02 (ref 1–1.03)
UROBILINOGEN UR STRIP-MCNC: 0.2 MG/DL (ref 0.2–1)
WBC # BLD AUTO: 6 X10E3/UL (ref 3.4–10.8)

## 2020-11-11 ENCOUNTER — OFFICE VISIT (OUTPATIENT)
Dept: FAMILY MEDICINE | Facility: CLINIC | Age: 35
End: 2020-11-11
Payer: MEDICARE

## 2020-11-11 VITALS
HEIGHT: 71 IN | TEMPERATURE: 97 F | OXYGEN SATURATION: 97 % | DIASTOLIC BLOOD PRESSURE: 70 MMHG | WEIGHT: 204 LBS | SYSTOLIC BLOOD PRESSURE: 94 MMHG | HEART RATE: 78 BPM | BODY MASS INDEX: 28.56 KG/M2

## 2020-11-11 DIAGNOSIS — L50.8 CHRONIC URTICARIA: ICD-10-CM

## 2020-11-11 DIAGNOSIS — E78.5 HYPERLIPIDEMIA, UNSPECIFIED HYPERLIPIDEMIA TYPE: Primary | ICD-10-CM

## 2020-11-11 DIAGNOSIS — F84.0 AUTISTIC DISORDER: ICD-10-CM

## 2020-11-11 PROBLEM — R11.15 CYCLIC VOMITING SYNDROME: Status: ACTIVE | Noted: 2020-11-11

## 2020-11-11 PROCEDURE — 99202 OFFICE O/P NEW SF 15 MIN: CPT | Mod: S$GLB,,, | Performed by: INTERNAL MEDICINE

## 2020-11-11 PROCEDURE — 99202 PR OFFICE/OUTPT VISIT, NEW, LEVL II, 15-29 MIN: ICD-10-PCS | Mod: S$GLB,,, | Performed by: INTERNAL MEDICINE

## 2020-11-11 RX ORDER — LANOLIN ALCOHOL/MO/W.PET/CERES
500 CREAM (GRAM) TOPICAL DAILY
COMMUNITY

## 2020-11-11 RX ORDER — CETIRIZINE HYDROCHLORIDE, PSEUDOEPHEDRINE HYDROCHLORIDE 5; 120 MG/1; MG/1
1 TABLET, FILM COATED, EXTENDED RELEASE ORAL 2 TIMES DAILY PRN
COMMUNITY
End: 2022-06-29

## 2020-11-11 NOTE — PROGRESS NOTES
Subjective:       Patient ID: Wilver Cee Jr. is a 35 y.o. male.    Chief Complaint: Establish Care (new patient establishment)    Here to establish care with me; previous PCP was Dr. Cutler who is retiring.  He is here with his dad who provides history.  He has a h/o autism, idopathic urticaria, abnormal JOSH, cyclic vomiting followed by Rheum, GI, and Allergy/Immunology.  He was previously treated with a fibrate for lipids but that was discontinued when he started to have issues with urticaria.        Review of Systems   Constitutional: Negative for chills, fatigue, fever and unexpected weight change.   HENT: Negative for congestion, hearing loss, postnasal drip, rhinorrhea, trouble swallowing and voice change.    Eyes: Negative for photophobia and visual disturbance.   Respiratory: Negative for apnea, cough, choking, chest tightness, shortness of breath and wheezing.    Cardiovascular: Negative for chest pain, palpitations and leg swelling.   Gastrointestinal: Positive for vomiting (off and on). Negative for abdominal pain, blood in stool, constipation, diarrhea, nausea and rectal pain.   Endocrine: Negative for cold intolerance, heat intolerance, polydipsia and polyuria.   Genitourinary: Negative for decreased urine volume, difficulty urinating, discharge, dysuria, flank pain, frequency, genital sores, hematuria, testicular pain and urgency.   Musculoskeletal: Positive for neck stiffness. Negative for arthralgias, back pain, gait problem, joint swelling, myalgias and neck pain.   Skin: Positive for rash (hives on entire body). Negative for color change and wound.   Allergic/Immunologic: Positive for food allergies (lactose intolerance). Negative for environmental allergies.   Neurological: Positive for speech difficulty. Negative for dizziness, tremors, seizures, syncope, facial asymmetry, weakness, light-headedness, numbness and headaches.   Hematological: Negative for adenopathy. Does not bruise/bleed  easily.   Psychiatric/Behavioral: Negative for confusion, hallucinations, sleep disturbance and suicidal ideas. The patient is nervous/anxious.        Past Medical History:   Diagnosis Date    Allergy     Autistic disorder     Cyclic vomiting syndrome 11/11/2020    Hyperlipidemia     Urticaria       Past Surgical History:   Procedure Laterality Date    ADENOIDECTOMY      MULTIPLE TOOTH EXTRACTIONS      TYMPANOSTOMY TUBE PLACEMENT         Family History   Problem Relation Age of Onset    Epilepsy Mother     Hypertension Father     Heart attack Father 54    No Known Problems Sister     No Known Problems Brother     No Known Problems Daughter     No Known Problems Son     No Known Problems Maternal Aunt     No Known Problems Maternal Uncle     No Known Problems Paternal Aunt     No Known Problems Paternal Uncle     No Known Problems Maternal Grandmother     No Known Problems Maternal Grandfather     No Known Problems Paternal Grandmother     No Known Problems Paternal Grandfather        Social History     Socioeconomic History    Marital status: Single     Spouse name: Not on file    Number of children: Not on file    Years of education: Not on file    Highest education level: Not on file   Occupational History    Occupation: disabled   Social Needs    Financial resource strain: Not on file    Food insecurity     Worry: Not on file     Inability: Not on file    Transportation needs     Medical: Not on file     Non-medical: Not on file   Tobacco Use    Smoking status: Never Smoker    Smokeless tobacco: Never Used   Substance and Sexual Activity    Alcohol use: No    Drug use: No    Sexual activity: Never   Lifestyle    Physical activity     Days per week: Not on file     Minutes per session: Not on file    Stress: Rather much   Relationships    Social connections     Talks on phone: Not on file     Gets together: Not on file     Attends Anabaptist service: Not on file     Active  "member of club or organization: Not on file     Attends meetings of clubs or organizations: Not on file     Relationship status: Not on file   Other Topics Concern    Not on file   Social History Narrative    Live with father       Current Outpatient Medications   Medication Sig Dispense Refill    ascorbic Acid (VITAMIN C) 500 mg CpSR Take 500 mg by mouth once daily.      cetirizine-pseudoephedrine 5-120 mg Tb12 Take 1 tablet by mouth 2 (two) times daily as needed.      clotrimazole (LOTRIMIN) 1 % Soln Apply topically 2 (two) times daily.      diclofenac sodium (VOLTAREN) 1 % Gel 2 gm on affected shoulder tid prn 1 Tube 2    famotidine (PEPCID ORAL) Take by mouth.      loratadine (CLARITIN) 10 mg tablet Take 10 mg by mouth daily as needed for Allergies.      NYSTOP powder 2 (two) times daily. Apply to affected area  1    ondansetron (ZOFRAN) 4 mg/5 mL solution 5 ML BY MOUTH THREE TIMES A DAY AS NEEDED  1    vitamin D (VITAMIN D3) 1000 units Tab Take 2,000 Units by mouth once daily.      fexofenadine (ALLEGRA) 60 MG tablet Take 1 tablet (60 mg total) by mouth 2 (two) times daily. 60 tablet 3     No current facility-administered medications for this visit.        Review of patient's allergies indicates:  No Known Allergies  Objective:    HPI     Establish Care      Additional comments: new patient establishment          Last edited by Cris Crockett MA on 11/11/2020  1:21 PM. (History)      Blood pressure 94/70, pulse 78, temperature 97.1 °F (36.2 °C), temperature source Temporal, height 5' 11" (1.803 m), weight 92.5 kg (204 lb), SpO2 97 %. Body mass index is 28.45 kg/m².   Physical Exam  Vitals signs and nursing note reviewed.   Constitutional:       General: He is not in acute distress.     Appearance: He is well-developed. He is obese. He is not ill-appearing, toxic-appearing or diaphoretic.   HENT:      Head: Normocephalic and atraumatic.      Right Ear: Hearing and external ear normal.      Left Ear: " Hearing, tympanic membrane, ear canal and external ear normal.      Ears:      Comments: Right TM obscured by cerumen     Nose: Nose normal.      Mouth/Throat:      Pharynx: Uvula midline.      Tonsils: 2+ on the right. 2+ on the left.   Eyes:      General: Lids are normal. No scleral icterus.        Right eye: No discharge.         Left eye: No discharge.      Conjunctiva/sclera: Conjunctivae normal.      Right eye: Right conjunctiva is not injected. No hemorrhage.     Left eye: Left conjunctiva is not injected. No hemorrhage.     Pupils: Pupils are equal, round, and reactive to light.   Neck:      Thyroid: No thyromegaly.      Vascular: No carotid bruit.   Cardiovascular:      Rate and Rhythm: Normal rate and regular rhythm.      Pulses:           Dorsalis pedis pulses are 2+ on the right side and 2+ on the left side.      Heart sounds: Normal heart sounds. No murmur. No friction rub. No gallop.    Pulmonary:      Effort: Pulmonary effort is normal. No respiratory distress.      Breath sounds: Normal breath sounds. No wheezing, rhonchi or rales.   Abdominal:      General: Bowel sounds are normal. There is no distension or abdominal bruit.      Palpations: Abdomen is soft. There is no mass or pulsatile mass.      Tenderness: There is no abdominal tenderness. There is no guarding or rebound.      Hernia: No hernia is present.   Musculoskeletal:      Right lower leg: No edema.      Left lower leg: No edema.   Lymphadenopathy:      Cervical: No cervical adenopathy.   Skin:     General: Skin is warm and dry.      Findings: Rash present. Rash is urticarial (scattered small wheals).   Neurological:      General: No focal deficit present.      Mental Status: He is alert.      Motor: No tremor.   Psychiatric:         Mood and Affect: Mood normal.         Speech: He is communicative (qill answer simple questions but speech is difficult to understand). Speech is slurred (difficult to understand).         Behavior: Behavior  normal. Behavior is cooperative.         Cognition and Memory: Cognition is impaired.      Comments: He keeps a plastic razor cover gripped in right hand as a comforting tool             Assessment:       1. Hyperlipidemia, unspecified hyperlipidemia type    2. Autistic disorder    3. Chronic urticaria        Plan:       Wilver was seen today for establish care.    Diagnoses and all orders for this visit:    Hyperlipidemia, unspecified hyperlipidemia type  Comments:  Will get lipids with next labs for Rheum  Orders:  -     Lipid Panel; Future  -     Lipid Panel    Autistic disorder    Chronic urticaria

## 2021-01-13 ENCOUNTER — OFFICE VISIT (OUTPATIENT)
Dept: RHEUMATOLOGY | Facility: CLINIC | Age: 36
End: 2021-01-13
Payer: MEDICARE

## 2021-01-13 VITALS
BODY MASS INDEX: 29.02 KG/M2 | SYSTOLIC BLOOD PRESSURE: 114 MMHG | WEIGHT: 208.13 LBS | DIASTOLIC BLOOD PRESSURE: 77 MMHG | TEMPERATURE: 98 F

## 2021-01-13 DIAGNOSIS — R76.8 POSITIVE ANA (ANTINUCLEAR ANTIBODY): Primary | ICD-10-CM

## 2021-01-13 PROCEDURE — 99213 PR OFFICE/OUTPT VISIT, EST, LEVL III, 20-29 MIN: ICD-10-PCS | Mod: S$GLB,,, | Performed by: INTERNAL MEDICINE

## 2021-01-13 PROCEDURE — 99213 OFFICE O/P EST LOW 20 MIN: CPT | Mod: S$GLB,,, | Performed by: INTERNAL MEDICINE

## 2021-01-16 LAB
ALBUMIN SERPL-MCNC: 4.4 G/DL (ref 4–5)
ALBUMIN/GLOB SERPL: 1.5 {RATIO} (ref 1.2–2.2)
ALP SERPL-CCNC: 128 IU/L (ref 39–117)
ALT SERPL-CCNC: 16 IU/L (ref 0–44)
APPEARANCE UR: CLEAR
AST SERPL-CCNC: 17 IU/L (ref 0–40)
BASOPHILS # BLD AUTO: 0 X10E3/UL (ref 0–0.2)
BASOPHILS NFR BLD AUTO: 0 %
BILIRUB SERPL-MCNC: 0.5 MG/DL (ref 0–1.2)
BILIRUB UR QL STRIP: NEGATIVE
BUN SERPL-MCNC: 19 MG/DL (ref 6–20)
BUN/CREAT SERPL: 18 (ref 9–20)
CALCIUM SERPL-MCNC: 9.4 MG/DL (ref 8.7–10.2)
CHLORIDE SERPL-SCNC: 102 MMOL/L (ref 96–106)
CHOLEST SERPL-MCNC: 143 MG/DL (ref 100–199)
CO2 SERPL-SCNC: 26 MMOL/L (ref 20–29)
COLOR UR: YELLOW
CREAT SERPL-MCNC: 1.06 MG/DL (ref 0.76–1.27)
EOSINOPHIL # BLD AUTO: 0.1 X10E3/UL (ref 0–0.4)
EOSINOPHIL NFR BLD AUTO: 1 %
ERYTHROCYTE [DISTWIDTH] IN BLOOD BY AUTOMATED COUNT: 13.1 % (ref 11.6–15.4)
GLOBULIN SER CALC-MCNC: 2.9 G/DL (ref 1.5–4.5)
GLUCOSE SERPL-MCNC: 92 MG/DL (ref 65–99)
GLUCOSE UR QL: NEGATIVE
HCT VFR BLD AUTO: 47.8 % (ref 37.5–51)
HDLC SERPL-MCNC: 28 MG/DL
HGB BLD-MCNC: 16.1 G/DL (ref 13–17.7)
HGB UR QL STRIP: NEGATIVE
IMM GRANULOCYTES # BLD AUTO: 0 X10E3/UL (ref 0–0.1)
IMM GRANULOCYTES NFR BLD AUTO: 1 %
KETONES UR QL STRIP: NEGATIVE
LDLC SERPL CALC-MCNC: 89 MG/DL (ref 0–99)
LEUKOCYTE ESTERASE UR QL STRIP: NEGATIVE
LYMPHOCYTES # BLD AUTO: 1.8 X10E3/UL (ref 0.7–3.1)
LYMPHOCYTES NFR BLD AUTO: 27 %
MCH RBC QN AUTO: 30.9 PG (ref 26.6–33)
MCHC RBC AUTO-ENTMCNC: 33.7 G/DL (ref 31.5–35.7)
MCV RBC AUTO: 92 FL (ref 79–97)
MICRO URNS: NORMAL
MONOCYTES # BLD AUTO: 0.4 X10E3/UL (ref 0.1–0.9)
MONOCYTES NFR BLD AUTO: 6 %
NEUTROPHILS # BLD AUTO: 4.3 X10E3/UL (ref 1.4–7)
NEUTROPHILS NFR BLD AUTO: 65 %
NITRITE UR QL STRIP: NEGATIVE
PH UR STRIP: 5.5 [PH] (ref 5–7.5)
PLATELET # BLD AUTO: 124 X10E3/UL (ref 150–450)
POTASSIUM SERPL-SCNC: 4.1 MMOL/L (ref 3.5–5.2)
PROT SERPL-MCNC: 7.3 G/DL (ref 6–8.5)
PROT UR QL STRIP: NEGATIVE
RBC # BLD AUTO: 5.21 X10E6/UL (ref 4.14–5.8)
SODIUM SERPL-SCNC: 141 MMOL/L (ref 134–144)
SP GR UR: 1.03 (ref 1–1.03)
TRIGL SERPL-MCNC: 149 MG/DL (ref 0–149)
UROBILINOGEN UR STRIP-MCNC: 0.2 MG/DL (ref 0.2–1)
VLDLC SERPL CALC-MCNC: 26 MG/DL (ref 5–40)
WBC # BLD AUTO: 6.6 X10E3/UL (ref 3.4–10.8)

## 2021-03-18 ENCOUNTER — OFFICE VISIT (OUTPATIENT)
Dept: FAMILY MEDICINE | Facility: CLINIC | Age: 36
End: 2021-03-18
Payer: MEDICARE

## 2021-03-18 VITALS
TEMPERATURE: 98 F | HEIGHT: 71 IN | OXYGEN SATURATION: 96 % | DIASTOLIC BLOOD PRESSURE: 80 MMHG | WEIGHT: 208 LBS | SYSTOLIC BLOOD PRESSURE: 114 MMHG | HEART RATE: 93 BPM | BODY MASS INDEX: 29.12 KG/M2

## 2021-03-18 DIAGNOSIS — E78.5 HYPERLIPIDEMIA, UNSPECIFIED HYPERLIPIDEMIA TYPE: ICD-10-CM

## 2021-03-18 DIAGNOSIS — F84.0 AUTISTIC DISORDER: Primary | ICD-10-CM

## 2021-03-18 PROCEDURE — 99213 PR OFFICE/OUTPT VISIT, EST, LEVL III, 20-29 MIN: ICD-10-PCS | Mod: S$GLB,,, | Performed by: INTERNAL MEDICINE

## 2021-03-18 PROCEDURE — 99213 OFFICE O/P EST LOW 20 MIN: CPT | Mod: S$GLB,,, | Performed by: INTERNAL MEDICINE

## 2021-04-29 ENCOUNTER — PATIENT MESSAGE (OUTPATIENT)
Dept: RESEARCH | Facility: HOSPITAL | Age: 36
End: 2021-04-29

## 2021-07-14 ENCOUNTER — OFFICE VISIT (OUTPATIENT)
Dept: RHEUMATOLOGY | Facility: CLINIC | Age: 36
End: 2021-07-14
Payer: MEDICARE

## 2021-07-14 VITALS
WEIGHT: 212.63 LBS | BODY MASS INDEX: 29.65 KG/M2 | DIASTOLIC BLOOD PRESSURE: 89 MMHG | SYSTOLIC BLOOD PRESSURE: 129 MMHG

## 2021-07-14 DIAGNOSIS — R76.8 POSITIVE ANA (ANTINUCLEAR ANTIBODY): Primary | ICD-10-CM

## 2021-07-14 PROCEDURE — 99213 PR OFFICE/OUTPT VISIT, EST, LEVL III, 20-29 MIN: ICD-10-PCS | Mod: S$GLB,,, | Performed by: INTERNAL MEDICINE

## 2021-07-14 PROCEDURE — 99213 OFFICE O/P EST LOW 20 MIN: CPT | Mod: S$GLB,,, | Performed by: INTERNAL MEDICINE

## 2021-07-22 DIAGNOSIS — D69.6 THROMBOCYTOPENIA: Primary | ICD-10-CM

## 2021-07-22 LAB
ALBUMIN SERPL-MCNC: 4.4 G/DL (ref 4–5)
ALBUMIN/GLOB SERPL: 1.8 {RATIO} (ref 1.2–2.2)
ALP SERPL-CCNC: 130 IU/L (ref 48–121)
ALT SERPL-CCNC: 19 IU/L (ref 0–44)
ANA SER QL: NEGATIVE
APPEARANCE UR: CLEAR
AST SERPL-CCNC: 16 IU/L (ref 0–40)
BASOPHILS # BLD AUTO: 0 X10E3/UL (ref 0–0.2)
BASOPHILS NFR BLD AUTO: 1 %
BILIRUB SERPL-MCNC: 0.4 MG/DL (ref 0–1.2)
BILIRUB UR QL STRIP: NEGATIVE
BUN SERPL-MCNC: 14 MG/DL (ref 6–20)
BUN/CREAT SERPL: 13 (ref 9–20)
CALCIUM SERPL-MCNC: 9.3 MG/DL (ref 8.7–10.2)
CHLORIDE SERPL-SCNC: 107 MMOL/L (ref 96–106)
CO2 SERPL-SCNC: 24 MMOL/L (ref 20–29)
COLOR UR: YELLOW
CREAT SERPL-MCNC: 1.08 MG/DL (ref 0.76–1.27)
EOSINOPHIL # BLD AUTO: 0.1 X10E3/UL (ref 0–0.4)
EOSINOPHIL NFR BLD AUTO: 1 %
ERYTHROCYTE [DISTWIDTH] IN BLOOD BY AUTOMATED COUNT: 13 % (ref 11.6–15.4)
GLOBULIN SER CALC-MCNC: 2.5 G/DL (ref 1.5–4.5)
GLUCOSE SERPL-MCNC: 100 MG/DL (ref 65–99)
GLUCOSE UR QL: NEGATIVE
HCT VFR BLD AUTO: 49 % (ref 37.5–51)
HGB BLD-MCNC: 16.2 G/DL (ref 13–17.7)
HGB UR QL STRIP: NEGATIVE
IMM GRANULOCYTES # BLD AUTO: 0 X10E3/UL (ref 0–0.1)
IMM GRANULOCYTES NFR BLD AUTO: 0 %
KETONES UR QL STRIP: NEGATIVE
LEUKOCYTE ESTERASE UR QL STRIP: NEGATIVE
LYMPHOCYTES # BLD AUTO: 1.6 X10E3/UL (ref 0.7–3.1)
LYMPHOCYTES NFR BLD AUTO: 24 %
MCH RBC QN AUTO: 30.1 PG (ref 26.6–33)
MCHC RBC AUTO-ENTMCNC: 33.1 G/DL (ref 31.5–35.7)
MCV RBC AUTO: 91 FL (ref 79–97)
MICRO URNS: NORMAL
MONOCYTES # BLD AUTO: 0.3 X10E3/UL (ref 0.1–0.9)
MONOCYTES NFR BLD AUTO: 5 %
NEUTROPHILS # BLD AUTO: 4.6 X10E3/UL (ref 1.4–7)
NEUTROPHILS NFR BLD AUTO: 69 %
NITRITE UR QL STRIP: NEGATIVE
PH UR STRIP: 5.5 [PH] (ref 5–7.5)
PLATELET # BLD AUTO: 98 X10E3/UL (ref 150–450)
POTASSIUM SERPL-SCNC: 4.1 MMOL/L (ref 3.5–5.2)
PROT SERPL-MCNC: 6.9 G/DL (ref 6–8.5)
PROT UR QL STRIP: NORMAL
RBC # BLD AUTO: 5.39 X10E6/UL (ref 4.14–5.8)
SODIUM SERPL-SCNC: 141 MMOL/L (ref 134–144)
SP GR UR: 1.02 (ref 1–1.03)
UROBILINOGEN UR STRIP-MCNC: 0.2 MG/DL (ref 0.2–1)
WBC # BLD AUTO: 6.7 X10E3/UL (ref 3.4–10.8)

## 2021-08-03 ENCOUNTER — IMMUNIZATION (OUTPATIENT)
Dept: PRIMARY CARE CLINIC | Facility: CLINIC | Age: 36
End: 2021-08-03

## 2021-08-03 DIAGNOSIS — Z23 NEED FOR VACCINATION: Primary | ICD-10-CM

## 2021-08-03 PROCEDURE — 0001A COVID-19, MRNA, LNP-S, PF, 30 MCG/0.3 ML DOSE VACCINE: CPT | Mod: CV19,S$GLB,, | Performed by: FAMILY MEDICINE

## 2021-08-03 PROCEDURE — 0001A COVID-19, MRNA, LNP-S, PF, 30 MCG/0.3 ML DOSE VACCINE: ICD-10-PCS | Mod: CV19,S$GLB,, | Performed by: FAMILY MEDICINE

## 2021-08-03 PROCEDURE — 91300 COVID-19, MRNA, LNP-S, PF, 30 MCG/0.3 ML DOSE VACCINE: ICD-10-PCS | Mod: S$GLB,,, | Performed by: FAMILY MEDICINE

## 2021-08-03 PROCEDURE — 91300 COVID-19, MRNA, LNP-S, PF, 30 MCG/0.3 ML DOSE VACCINE: CPT | Mod: S$GLB,,, | Performed by: FAMILY MEDICINE

## 2021-08-19 PROBLEM — D69.6 THROMBOCYTOPENIA: Status: ACTIVE | Noted: 2021-08-19

## 2021-08-20 ENCOUNTER — OFFICE VISIT (OUTPATIENT)
Dept: HEMATOLOGY/ONCOLOGY | Facility: CLINIC | Age: 36
End: 2021-08-20
Payer: MEDICARE

## 2021-08-20 ENCOUNTER — TELEPHONE (OUTPATIENT)
Dept: HEMATOLOGY/ONCOLOGY | Facility: CLINIC | Age: 36
End: 2021-08-20

## 2021-08-20 VITALS
TEMPERATURE: 98 F | DIASTOLIC BLOOD PRESSURE: 86 MMHG | SYSTOLIC BLOOD PRESSURE: 128 MMHG | HEIGHT: 69 IN | RESPIRATION RATE: 19 BRPM | WEIGHT: 210.63 LBS | HEART RATE: 96 BPM | BODY MASS INDEX: 31.2 KG/M2

## 2021-08-20 DIAGNOSIS — R53.83 OTHER FATIGUE: ICD-10-CM

## 2021-08-20 DIAGNOSIS — D69.6 THROMBOCYTOPENIA: ICD-10-CM

## 2021-08-20 PROCEDURE — 99203 OFFICE O/P NEW LOW 30 MIN: CPT | Mod: S$GLB,,, | Performed by: INTERNAL MEDICINE

## 2021-08-20 PROCEDURE — 99203 PR OFFICE/OUTPT VISIT, NEW, LEVL III, 30-44 MIN: ICD-10-PCS | Mod: S$GLB,,, | Performed by: INTERNAL MEDICINE

## 2021-08-23 LAB
ALBUMIN SERPL-MCNC: 4.3 G/DL (ref 3.6–5.1)
ALBUMIN/GLOB SERPL: 1.7 (CALC) (ref 1–2.5)
ALP SERPL-CCNC: 117 U/L (ref 36–130)
ALT SERPL-CCNC: 15 U/L (ref 9–46)
APPEARANCE UR: CLEAR
AST SERPL-CCNC: 13 U/L (ref 10–40)
BACTERIA #/AREA URNS HPF: NORMAL /HPF
BASOPHILS # BLD AUTO: 18 CELLS/UL (ref 0–200)
BASOPHILS NFR BLD AUTO: 0.3 %
BILIRUB SERPL-MCNC: 0.4 MG/DL (ref 0.2–1.2)
BILIRUB UR QL STRIP: NEGATIVE
BUN SERPL-MCNC: 17 MG/DL (ref 7–25)
BUN/CREAT SERPL: ABNORMAL (CALC) (ref 6–22)
CALCIUM SERPL-MCNC: 9.5 MG/DL (ref 8.6–10.3)
CHLORIDE SERPL-SCNC: 103 MMOL/L (ref 98–110)
CO2 SERPL-SCNC: 30 MMOL/L (ref 20–32)
COLOR UR: YELLOW
COMMENT: NORMAL
CREAT SERPL-MCNC: 1.08 MG/DL (ref 0.6–1.35)
EBV NA IGG SER IA-ACNC: <18 U/ML
EBV PATRN SPEC IB-IMP: ABNORMAL
EBV VCA IGG SER IA-ACNC: >750 U/ML
EBV VCA IGM SER IA-ACNC: <36 U/ML
EOSINOPHIL # BLD AUTO: 112 CELLS/UL (ref 15–500)
EOSINOPHIL NFR BLD AUTO: 1.9 %
ERYTHROCYTE [DISTWIDTH] IN BLOOD BY AUTOMATED COUNT: 13 % (ref 11–15)
GLOBULIN SER CALC-MCNC: 2.6 G/DL (CALC) (ref 1.9–3.7)
GLUCOSE SERPL-MCNC: 103 MG/DL (ref 65–99)
GLUCOSE UR QL STRIP: NEGATIVE
HAV IGM SERPL QL IA: NORMAL
HBV CORE IGM SERPL QL IA: NORMAL
HBV SURFACE AG SERPL QL IA: NORMAL
HCT VFR BLD AUTO: 45.8 % (ref 38.5–50)
HCV AB S/CO SERPL IA: 0.01
HCV AB SERPL QL IA: NORMAL
HGB BLD-MCNC: 15.8 G/DL (ref 13.2–17.1)
HGB UR QL STRIP: NEGATIVE
HIV 1+2 AB+HIV1 P24 AG SERPL QL IA: NORMAL
HYALINE CASTS #/AREA URNS LPF: NORMAL /LPF
KETONES UR QL STRIP: NEGATIVE
LEUKOCYTE ESTERASE UR QL STRIP: NEGATIVE
LYMPHOCYTES # BLD AUTO: 1558 CELLS/UL (ref 850–3900)
LYMPHOCYTES NFR BLD AUTO: 26.4 %
MCH RBC QN AUTO: 30.6 PG (ref 27–33)
MCHC RBC AUTO-ENTMCNC: 34.5 G/DL (ref 32–36)
MCV RBC AUTO: 88.6 FL (ref 80–100)
MONOCYTES # BLD AUTO: 330 CELLS/UL (ref 200–950)
MONOCYTES NFR BLD AUTO: 5.6 %
NEUTROPHILS # BLD AUTO: 3882 CELLS/UL (ref 1500–7800)
NEUTROPHILS NFR BLD AUTO: 65.8 %
NITRITE UR QL STRIP: NEGATIVE
PA IGG BLD QL FC: NEGATIVE
PH UR STRIP: 7 [PH] (ref 5–8)
PLATELET # BLD AUTO: 116 THOUSAND/UL (ref 140–400)
PLATELET IGG SER QL FC: NEGATIVE
PLATELET IGM SER QL: NEGATIVE
PMV BLD REES-ECKER: 11.3 FL (ref 7.5–12.5)
POTASSIUM SERPL-SCNC: 3.9 MMOL/L (ref 3.5–5.3)
PROT SERPL-MCNC: 6.9 G/DL (ref 6.1–8.1)
PROT UR QL STRIP: NEGATIVE
RBC # BLD AUTO: 5.17 MILLION/UL (ref 4.2–5.8)
RBC #/AREA URNS HPF: NORMAL /HPF
SERVICE CMNT-IMP: ABNORMAL
SODIUM SERPL-SCNC: 141 MMOL/L (ref 135–146)
SP GR UR STRIP: 1 (ref 1–1.03)
SQUAMOUS #/AREA URNS HPF: NORMAL /HPF
WBC # BLD AUTO: 5.9 THOUSAND/UL (ref 3.8–10.8)
WBC #/AREA URNS HPF: NORMAL /HPF

## 2021-08-24 ENCOUNTER — IMMUNIZATION (OUTPATIENT)
Dept: PRIMARY CARE CLINIC | Facility: CLINIC | Age: 36
End: 2021-08-24

## 2021-08-24 DIAGNOSIS — Z23 NEED FOR VACCINATION: Primary | ICD-10-CM

## 2021-08-24 PROCEDURE — 91300 COVID-19, MRNA, LNP-S, PF, 30 MCG/0.3 ML DOSE VACCINE: ICD-10-PCS | Mod: S$GLB,,, | Performed by: FAMILY MEDICINE

## 2021-08-24 PROCEDURE — 0002A COVID-19, MRNA, LNP-S, PF, 30 MCG/0.3 ML DOSE VACCINE: ICD-10-PCS | Mod: CV19,S$GLB,, | Performed by: FAMILY MEDICINE

## 2021-08-24 PROCEDURE — 0002A COVID-19, MRNA, LNP-S, PF, 30 MCG/0.3 ML DOSE VACCINE: CPT | Mod: CV19,S$GLB,, | Performed by: FAMILY MEDICINE

## 2021-08-24 PROCEDURE — 91300 COVID-19, MRNA, LNP-S, PF, 30 MCG/0.3 ML DOSE VACCINE: CPT | Mod: S$GLB,,, | Performed by: FAMILY MEDICINE

## 2021-09-27 ENCOUNTER — PATIENT MESSAGE (OUTPATIENT)
Dept: FAMILY MEDICINE | Facility: CLINIC | Age: 36
End: 2021-09-27

## 2021-09-27 ENCOUNTER — PATIENT MESSAGE (OUTPATIENT)
Dept: HEMATOLOGY/ONCOLOGY | Facility: CLINIC | Age: 36
End: 2021-09-27

## 2021-09-28 RX ORDER — NYSTATIN 100000 [USP'U]/G
POWDER TOPICAL 2 TIMES DAILY
Qty: 60 G | Refills: 0 | Status: SHIPPED | OUTPATIENT
Start: 2021-09-28 | End: 2022-06-29

## 2021-09-29 ENCOUNTER — OFFICE VISIT (OUTPATIENT)
Dept: HEMATOLOGY/ONCOLOGY | Facility: CLINIC | Age: 36
End: 2021-09-29
Payer: MEDICARE

## 2021-09-29 VITALS
BODY MASS INDEX: 28.35 KG/M2 | SYSTOLIC BLOOD PRESSURE: 125 MMHG | DIASTOLIC BLOOD PRESSURE: 78 MMHG | RESPIRATION RATE: 18 BRPM | WEIGHT: 198 LBS | HEART RATE: 67 BPM | HEIGHT: 70 IN

## 2021-09-29 DIAGNOSIS — D69.6 THROMBOCYTOPENIA: Primary | ICD-10-CM

## 2021-09-29 PROCEDURE — 99215 OFFICE O/P EST HI 40 MIN: CPT | Mod: S$GLB,,, | Performed by: INTERNAL MEDICINE

## 2021-09-29 PROCEDURE — 99215 PR OFFICE/OUTPT VISIT, EST, LEVL V, 40-54 MIN: ICD-10-PCS | Mod: S$GLB,,, | Performed by: INTERNAL MEDICINE

## 2021-12-29 ENCOUNTER — OFFICE VISIT (OUTPATIENT)
Dept: HEMATOLOGY/ONCOLOGY | Facility: CLINIC | Age: 36
End: 2021-12-29
Payer: MEDICARE

## 2021-12-29 VITALS
RESPIRATION RATE: 18 BRPM | DIASTOLIC BLOOD PRESSURE: 83 MMHG | HEART RATE: 82 BPM | HEIGHT: 70 IN | SYSTOLIC BLOOD PRESSURE: 117 MMHG | BODY MASS INDEX: 30.35 KG/M2 | WEIGHT: 212 LBS

## 2021-12-29 DIAGNOSIS — D69.6 THROMBOCYTOPENIA: Primary | ICD-10-CM

## 2021-12-29 PROCEDURE — 99213 OFFICE O/P EST LOW 20 MIN: CPT | Mod: S$GLB,,, | Performed by: INTERNAL MEDICINE

## 2021-12-29 PROCEDURE — 99213 PR OFFICE/OUTPT VISIT, EST, LEVL III, 20-29 MIN: ICD-10-PCS | Mod: S$GLB,,, | Performed by: INTERNAL MEDICINE

## 2022-02-01 ENCOUNTER — TELEPHONE (OUTPATIENT)
Dept: NEUROLOGY | Facility: CLINIC | Age: 37
End: 2022-02-01

## 2022-02-01 NOTE — TELEPHONE ENCOUNTER
----- Message from Carole Rose sent at 2/1/2022  3:35 PM CST -----  PLEASE CONTACT PATIENT TO SCHEDULE

## 2022-03-11 ENCOUNTER — PES CALL (OUTPATIENT)
Dept: ADMINISTRATIVE | Facility: CLINIC | Age: 37
End: 2022-03-11

## 2022-03-16 ENCOUNTER — OFFICE VISIT (OUTPATIENT)
Dept: FAMILY MEDICINE | Facility: CLINIC | Age: 37
End: 2022-03-16
Payer: MEDICARE

## 2022-03-16 VITALS
HEIGHT: 70 IN | BODY MASS INDEX: 30.49 KG/M2 | HEART RATE: 92 BPM | TEMPERATURE: 96 F | OXYGEN SATURATION: 97 % | DIASTOLIC BLOOD PRESSURE: 64 MMHG | SYSTOLIC BLOOD PRESSURE: 90 MMHG | WEIGHT: 213 LBS

## 2022-03-16 DIAGNOSIS — F84.0 AUTISTIC DISORDER: Primary | ICD-10-CM

## 2022-03-16 DIAGNOSIS — E78.5 HYPERLIPIDEMIA, UNSPECIFIED HYPERLIPIDEMIA TYPE: ICD-10-CM

## 2022-03-16 PROCEDURE — 99213 OFFICE O/P EST LOW 20 MIN: CPT | Mod: AQ,S$GLB,, | Performed by: INTERNAL MEDICINE

## 2022-03-16 PROCEDURE — 99213 PR OFFICE/OUTPT VISIT, EST, LEVL III, 20-29 MIN: ICD-10-PCS | Mod: AQ,S$GLB,, | Performed by: INTERNAL MEDICINE

## 2022-03-16 NOTE — PROGRESS NOTES
Subjective:       Patient ID: Wilver Cee Jr. is a 36 y.o. male.    Chief Complaint: Annual Exam (90L form)    Here for routine check up; needs 90L form filled out  He is here with his dad who provides history.  He lives with his dad.  He is going to [facility] services.  He has a h/o autism, idopathic urticaria, abnormal JOSH, cyclic vomiting followed by Rheum, GI, and Allergy/Immunology although he reports there are no planned follow ups with Rheum.  Recently referred to Heme/Onc for low platelets.  He was previously treated with a fibrate for lipids but that was discontinued when he started to have issues with urticaria.        Review of Systems   Constitutional: Negative for activity change, appetite change, chills, diaphoresis, fatigue, fever and unexpected weight change.   HENT: Positive for postnasal drip. Negative for congestion, ear discharge, ear pain, hearing loss, nosebleeds, rhinorrhea, sinus pressure, sinus pain, sneezing, sore throat, tinnitus, trouble swallowing and voice change.    Eyes: Negative for photophobia, pain, discharge, redness, itching and visual disturbance.   Respiratory: Negative for apnea, cough, choking, chest tightness, shortness of breath and wheezing.    Cardiovascular: Negative for chest pain, palpitations and leg swelling.   Gastrointestinal: Positive for vomiting. Negative for abdominal distention, abdominal pain, blood in stool, constipation, diarrhea and nausea.   Endocrine: Negative for cold intolerance, heat intolerance, polydipsia and polyuria.   Genitourinary: Negative for decreased urine volume, difficulty urinating, dysuria, enuresis, flank pain, frequency, genital sores, hematuria, penile discharge, penile pain, scrotal swelling, testicular pain and urgency.   Musculoskeletal: Negative for arthralgias, back pain, gait problem, joint swelling, myalgias, neck pain and neck stiffness.   Skin: Negative for rash and wound.   Allergic/Immunologic: Negative for  environmental allergies, food allergies and immunocompromised state.   Neurological: Positive for speech difficulty. Negative for dizziness, tremors, seizures, syncope, facial asymmetry, weakness, light-headedness, numbness and headaches.   Hematological: Negative for adenopathy. Does not bruise/bleed easily.   Psychiatric/Behavioral: Negative for confusion, decreased concentration, hallucinations, self-injury, sleep disturbance and suicidal ideas. The patient is nervous/anxious.        Past Medical History:   Diagnosis Date    Allergy     Autistic disorder     Cyclic vomiting syndrome 11/11/2020    Hyperlipidemia     Thrombocytopenia 8/19/2021    Urticaria       Past Surgical History:   Procedure Laterality Date    ADENOIDECTOMY      MULTIPLE TOOTH EXTRACTIONS      TYMPANOSTOMY TUBE PLACEMENT         Family History   Problem Relation Age of Onset    Epilepsy Mother     Hypertension Father     Heart attack Father 54    No Known Problems Sister     No Known Problems Brother     No Known Problems Daughter     No Known Problems Son     No Known Problems Maternal Aunt     No Known Problems Maternal Uncle     No Known Problems Paternal Aunt     No Known Problems Paternal Uncle     No Known Problems Maternal Grandmother     No Known Problems Maternal Grandfather     No Known Problems Paternal Grandmother     No Known Problems Paternal Grandfather        Social History     Socioeconomic History    Marital status: Single   Occupational History    Occupation: disabled   Tobacco Use    Smoking status: Never Smoker    Smokeless tobacco: Never Used   Substance and Sexual Activity    Alcohol use: No    Drug use: No    Sexual activity: Never   Social History Narrative    Live with father       Current Outpatient Medications   Medication Sig Dispense Refill    ascorbic Acid (VITAMIN C) 500 mg CpSR Take 500 mg by mouth once daily.      cetirizine-pseudoephedrine 5-120 mg Tb12 Take 1 tablet by mouth  "2 (two) times daily as needed.      clotrimazole (LOTRIMIN) 1 % Soln Apply topically 2 (two) times daily.      diclofenac sodium (VOLTAREN) 1 % Gel 2 gm on affected shoulder tid prn 1 Tube 2    famotidine (PEPCID ORAL) Take by mouth.      loratadine (CLARITIN) 10 mg tablet Take 10 mg by mouth daily as needed for Allergies.      NYSTOP powder Apply topically 2 (two) times daily. Apply to affected area 60 g 0    ondansetron (ZOFRAN) 4 mg/5 mL solution 5 ML BY MOUTH THREE TIMES A DAY AS NEEDED  1    vitamin D (VITAMIN D3) 1000 units Tab Take 2,000 Units by mouth once daily.      fexofenadine (ALLEGRA) 60 MG tablet Take 1 tablet (60 mg total) by mouth 2 (two) times daily. (Patient taking differently: Take 60 mg by mouth as needed. ) 60 tablet 3     No current facility-administered medications for this visit.       Review of patient's allergies indicates:   Allergen Reactions    Ibuprofen      Lips swell (ADVIL)     Objective:    HPI     Annual Exam      Additional comments: 90L form          Last edited by Cris Crockett MA on 3/16/2022  1:37 PM. (History)      Blood pressure 90/64, pulse 92, temperature 96.3 °F (35.7 °C), temperature source Temporal, height 5' 10" (1.778 m), weight 96.6 kg (213 lb), SpO2 97 %. Body mass index is 30.56 kg/m².   Physical Exam  Vitals and nursing note reviewed.   Constitutional:       General: He is not in acute distress.     Appearance: He is well-developed. He is obese. He is not ill-appearing, toxic-appearing or diaphoretic.   HENT:      Head: Normocephalic and atraumatic.   Eyes:      General: No scleral icterus.        Right eye: No discharge.         Left eye: No discharge.      Conjunctiva/sclera: Conjunctivae normal.   Neck:      Vascular: No carotid bruit.   Cardiovascular:      Rate and Rhythm: Normal rate and regular rhythm.      Heart sounds: Normal heart sounds. No murmur heard.  Pulmonary:      Effort: Pulmonary effort is normal. No respiratory distress.      " Breath sounds: Normal breath sounds. No decreased breath sounds, wheezing, rhonchi or rales.   Abdominal:      General: There is no distension.      Palpations: Abdomen is soft.      Tenderness: There is no abdominal tenderness. There is no guarding or rebound.   Musculoskeletal:      Right lower leg: No edema.      Left lower leg: No edema.   Skin:     General: Skin is warm and dry.      Findings: No rash.   Neurological:      Mental Status: He is alert.      Motor: No tremor.   Psychiatric:         Mood and Affect: Mood normal.         Speech: Speech is slurred.         Behavior: Behavior normal.         Cognition and Memory: Cognition is impaired.             Assessment:       1. Autistic disorder    2. Hyperlipidemia, unspecified hyperlipidemia type        Plan:       Wilver was seen today for annual exam.    Diagnoses and all orders for this visit:    Autistic disorder    Hyperlipidemia, unspecified hyperlipidemia type  -     Lipid Panel; Future  -     Lipid Panel

## 2022-04-07 LAB
CHOLEST SERPL-MCNC: 137 MG/DL
CHOLEST/HDLC SERPL: 5.3 (CALC)
HDLC SERPL-MCNC: 26 MG/DL
LDLC SERPL CALC-MCNC: 83 MG/DL (CALC)
NONHDLC SERPL-MCNC: 111 MG/DL (CALC)
TRIGL SERPL-MCNC: 188 MG/DL

## 2022-06-28 NOTE — PROGRESS NOTES
"[facility] Hematology/Oncology  PROGRESS NOTE -  Follow-up Visit      Subjective:       Patient ID:   NAME: Wilver Cee Jr. : 1985     37 y.o. male    Referring Doc: Saji Eason, *  Other Physicians: Greg Smith; Juan; Roxanne; Jimenez Solis  (ENT)           Chief Complaint: tcp f/u       History of Present Illness:     Patient returns today for a regularly scheduled follow-up visit.  The patient is here today to go over the results of the recently ordered labs, tests and studies. He is here with his dad.       He is feeling ok. He reports that he is "good". No excessive bleeding and bruising. No CP, SOB, HA's or N/V.    He saw Dr Nix on 3/16/2022    Discussed covid precautions and he has had his vaccinations            ROS:   GEN: normal without any fever, night sweats or weight loss  HEENT: normal with no HA's, sore throat, stiff neck, changes in vision  CV: normal with no CP, SOB, PND, CRUZ or orthopnea  PULM: normal with no SOB, cough, hemoptysis, sputum or pleuritic pain  GI: normal with no abdominal pain, nausea, vomiting, constipation, diarrhea, melanotic stools, BRBPR, or hematemesis  : normal with no hematuria, dysuria  BREAST: normal with no mass, discharge, pain  SKIN: normal with no rash, erythema, bruising, or swelling    Pain Scale: 0    Allergies:  Review of patient's allergies indicates:   Allergen Reactions    Ibuprofen      Lips swell (ADVIL)       Medications:    Current Outpatient Medications:     ascorbic Acid (VITAMIN C) 500 mg CpSR, Take 500 mg by mouth once daily., Disp: , Rfl:     loratadine (CLARITIN) 10 mg tablet, Take 10 mg by mouth daily as needed for Allergies., Disp: , Rfl:     vitamin D (VITAMIN D3) 1000 units Tab, Take 2,000 Units by mouth once daily., Disp: , Rfl:     PMHx/PSHx Updates:  See patient's last visit with me on 2021.  See H&P on 2021        Pathology:  Cancer Staging  No matching staging information was found for the " "patient.          Objective:     Vitals:  Blood pressure 103/69, pulse 75, resp. rate 20, height 5' 9" (1.753 m), weight 97.1 kg (214 lb).    Physical Examination:   GEN: no apparent distress, comfortable; awake and answers questions; overweight  HEAD: atraumatic and normocephalic  EYES: no pallor, no icterus, PERRLA  ENT: OMM, no pharyngeal erythema, external ears WNL; no nasal discharge; no thrush  NECK: no masses, thyroid normal, trachea midline, no LAD/LN's, supple  CV: RRR with no murmur; normal pulse; normal S1 and S2; no pedal edema  CHEST: Normal respiratory effort; CTAB; normal breath sounds; no wheeze or crackles  ABDOM: nontender and nondistended; soft; normal bowel sounds; no rebound/guarding  MUSC/Skeletal: ROM normal; no crepitus; joints normal; no deformities or arthropathy  EXTREM: no clubbing, cyanosis, inflammation or swelling  SKIN: no rashes, lesions, ulcers, petechiae or subcutaneous nodules; no current urticaria  : no laird  NEURO: grossly intact; motor/sensory WNL; moving all 4 extremities; no tremors  PSYCH: normal mood, affect and behavior  LYMPH: normal cervical, supraclavicular, axillary and groin LN's        Labs:     Lab Results   Component Value Date    WBC 6.4 04/06/2022    HGB 15.3 04/06/2022    HCT 46.0 04/06/2022    MCV 91.1 04/06/2022     (L) 04/06/2022     CMP  Sodium   Date Value Ref Range Status   04/06/2022 140 135 - 146 mmol/L Final   09/20/2018 136 134 - 144 mmol/L      Potassium   Date Value Ref Range Status   04/06/2022 4.1 3.5 - 5.3 mmol/L Final     Chloride   Date Value Ref Range Status   04/06/2022 104 98 - 110 mmol/L Final   09/20/2018 103 98 - 110 mmol/L      CO2   Date Value Ref Range Status   04/06/2022 30 20 - 32 mmol/L Final     Glucose   Date Value Ref Range Status   04/06/2022 97 65 - 99 mg/dL Final     Comment:                   Fasting reference interval        09/20/2018 106 (H) 70 - 99 mg/dL      BUN   Date Value Ref Range Status   04/06/2022 17 7 - " 25 mg/dL Final     Creatinine   Date Value Ref Range Status   04/06/2022 1.04 0.60 - 1.35 mg/dL Final   09/20/2018 0.97 0.60 - 1.40 mg/dL      Calcium   Date Value Ref Range Status   04/06/2022 9.1 8.6 - 10.3 mg/dL Final     Total Protein   Date Value Ref Range Status   04/06/2022 6.9 6.1 - 8.1 g/dL Final     Albumin   Date Value Ref Range Status   04/06/2022 4.3 3.6 - 5.1 g/dL Final   09/20/2018 3.9 3.1 - 4.7 g/dL      Total Bilirubin   Date Value Ref Range Status   04/06/2022 0.4 0.2 - 1.2 mg/dL Final     Alkaline Phosphatase   Date Value Ref Range Status   07/09/2020 119 (H) 39 - 117 IU/L Final     AST   Date Value Ref Range Status   04/06/2022 18 10 - 40 U/L Final     ALT   Date Value Ref Range Status   04/06/2022 22 9 - 46 U/L Final     eGFR if    Date Value Ref Range Status   04/06/2022 106 > OR = 60 mL/min/1.73m2 Final     eGFR if non    Date Value Ref Range Status   04/06/2022 91 > OR = 60 mL/min/1.73m2 Final       Platelet Antibody, Direct, Flow Cytometry  Order: [id number]  Status:  Final result   Visible to patient:  Yes (seen) Next appt:  12/01/2021 at 03:15 PM in Rheumatology (Saji Eason MD) Dx:  Thrombocytopenia; Other fatigue    0 Result Notes   Ref Range & Units 1 mo ago   ANTI-IGG NEGATIVE NEGATIVE              Platelet Ab.IgG NEGATIVE NEGATIVE    Platelet Ab.IgM NEGATIVE NEGATIVE      HIV Ag/Ab 4th Gen NON-REACTIVE     Hep A IgM NON-REACTIVE NON-REACTIVE    Comment  For additional information, please refer to http://education.[employer].com/faq/MLF697 (This link is being provided for informational/ educational purposes only.)   Hepatitis B Surface Ag NON-REACTIVE NON-REACTIVE    Hep B C IgM NON-REACTIVE NON-REACTIVE    Hepatitis C Ab NON-REACTIVE NON-REACTIVE    Signal/Cutoff <1.00 0.01    Comment:     HCV antibody was non-reactive     EBV Interp.  Suggestive of a recent Dylan-Barr virus infection         Radiology/Diagnostic Studies:    No results  found.    I have reviewed all available lab results and radiology reports.    Assessment/Plan:   (1) 37 y.o. male with diagnosis of thrombocytopenia who has been referred by Saji Eason, * for evaluation by medical hematology/oncology.   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 Some sort of gastric or intestinal motility issue due to his underlying autoimmune process     (6) Positive JSOH screen for past 3 yrs          VISIT DIAGNOSES:      Thrombocytopenia          PLAN:  1. continue with supportive care measures   2. CBC every 3 months  3. Avoid NSAIDS, ASA products etc if at all possible  4. F/u with PCP, GI, Rheum     RTC in 6 months  Fax note to Saji Eason, *, Roxanne Nix Olivier    Discussion:     COVID-19 Discussion:    I had long discussion with patient and any applicable family about the COVID-19 coronavirus epidemic and the recommended precautions with regard to cancer and/or hematology patients. I have re-iterated the CDC recommendations for adequate hand washing, use of hand -like products, and coughing into elbow, etc. In addition, especially for our patients who are on chemotherapy and/or our otherwise  immunocompromised patients, I have recommended avoidance of crowds, including movie theaters, restaurants, churches, etc. I have recommended avoidance of any sick or symptomatic family members and/or friends. I have also recommended avoidance of any raw and unwashed food products, and general avoidance of food items that have not been prepared by themselves. The patient has been asked to call us immediately with any symptom developments, issues, questions or other general concerns.     I spent over 25 mins of time with the patient. Reviewed results of the recently ordered labs, tests and studies; made directives with regards to the results. Over half of this time was spent couseling and coordinating care.    I have explained all of the above in detail and the patient understands all of the current recommendation(s). I have answered all of their questions to the best of my ability and to their complete satisfaction.   The patient is to continue with the current management plan.            Electronically signed by Alexis Gomes MD

## 2022-06-29 ENCOUNTER — OFFICE VISIT (OUTPATIENT)
Dept: HEMATOLOGY/ONCOLOGY | Facility: CLINIC | Age: 37
End: 2022-06-29
Payer: MEDICARE

## 2022-06-29 VITALS
WEIGHT: 214 LBS | RESPIRATION RATE: 20 BRPM | BODY MASS INDEX: 31.7 KG/M2 | HEART RATE: 75 BPM | SYSTOLIC BLOOD PRESSURE: 103 MMHG | DIASTOLIC BLOOD PRESSURE: 69 MMHG | HEIGHT: 69 IN

## 2022-06-29 DIAGNOSIS — D69.6 THROMBOCYTOPENIA: Primary | ICD-10-CM

## 2022-06-29 PROCEDURE — 99213 OFFICE O/P EST LOW 20 MIN: CPT | Mod: S$GLB,,, | Performed by: INTERNAL MEDICINE

## 2022-06-29 PROCEDURE — 99213 PR OFFICE/OUTPT VISIT, EST, LEVL III, 20-29 MIN: ICD-10-PCS | Mod: S$GLB,,, | Performed by: INTERNAL MEDICINE

## 2022-07-11 RX ORDER — LEVALBUTEROL INHALATION SOLUTION 1.25 MG/3ML
1 SOLUTION RESPIRATORY (INHALATION) EVERY 4 HOURS PRN
Qty: 25 EACH | Refills: 1 | Status: SHIPPED | OUTPATIENT
Start: 2022-07-11 | End: 2022-10-28 | Stop reason: SDUPTHER

## 2022-07-19 ENCOUNTER — TELEPHONE (OUTPATIENT)
Dept: FAMILY MEDICINE | Facility: CLINIC | Age: 37
End: 2022-07-19

## 2022-07-19 RX ORDER — PROMETHAZINE HYDROCHLORIDE AND DEXTROMETHORPHAN HYDROBROMIDE 6.25; 15 MG/5ML; MG/5ML
5 SYRUP ORAL EVERY 4 HOURS PRN
Qty: 180 ML | Refills: 0 | Status: SHIPPED | OUTPATIENT
Start: 2022-07-19 | End: 2022-07-29

## 2022-07-19 NOTE — TELEPHONE ENCOUNTER
----- Message from Riri Ya LPN sent at 7/19/2022 10:03 AM CDT -----  Regarding: FW: med refill    ----- Message -----  From: Selin Andrade  Sent: 7/19/2022  10:03 AM CDT  To: Tulio Nix Staff  Subject: med refill                                       Patient father called to see if you can send the promethazine to [facility] [address] [place] [address] for son. Patient father stated he do not need that cough meds so he will ask pharmacy to  re-shelve meds.    FYI Father and son are both feeling better 10 days of  from having Covid but son/patient still has a bit of a  cough.

## 2022-10-26 ENCOUNTER — OFFICE VISIT (OUTPATIENT)
Dept: FAMILY MEDICINE | Facility: CLINIC | Age: 37
End: 2022-10-26
Payer: MEDICARE

## 2022-10-26 VITALS
DIASTOLIC BLOOD PRESSURE: 60 MMHG | BODY MASS INDEX: 31.7 KG/M2 | TEMPERATURE: 99 F | SYSTOLIC BLOOD PRESSURE: 102 MMHG | WEIGHT: 214 LBS | HEART RATE: 92 BPM | OXYGEN SATURATION: 95 % | HEIGHT: 69 IN

## 2022-10-26 DIAGNOSIS — J11.1 INFLUENZA: Primary | ICD-10-CM

## 2022-10-26 PROCEDURE — 99213 OFFICE O/P EST LOW 20 MIN: CPT | Mod: AQ,S$GLB,, | Performed by: INTERNAL MEDICINE

## 2022-10-26 PROCEDURE — 99213 PR OFFICE/OUTPT VISIT, EST, LEVL III, 20-29 MIN: ICD-10-PCS | Mod: AQ,S$GLB,, | Performed by: INTERNAL MEDICINE

## 2022-10-26 RX ORDER — ACETAMINOPHEN 500 MG
500 TABLET ORAL EVERY 4 HOURS PRN
Refills: 0 | COMMUNITY
Start: 2022-10-26

## 2022-10-26 RX ORDER — IBUPROFEN 200 MG
400 TABLET ORAL EVERY 6 HOURS PRN
Refills: 0 | COMMUNITY
Start: 2022-10-26

## 2022-10-26 RX ORDER — PROMETHAZINE HYDROCHLORIDE AND DEXTROMETHORPHAN HYDROBROMIDE 6.25; 15 MG/5ML; MG/5ML
5 SYRUP ORAL EVERY 4 HOURS PRN
Qty: 180 ML | Refills: 1 | Status: SHIPPED | OUTPATIENT
Start: 2022-10-26 | End: 2022-11-01 | Stop reason: SINTOL

## 2022-10-26 RX ORDER — CETIRIZINE HYDROCHLORIDE, PSEUDOEPHEDRINE HYDROCHLORIDE 5; 120 MG/1; MG/1
1 TABLET, FILM COATED, EXTENDED RELEASE ORAL 2 TIMES DAILY
Refills: 0 | COMMUNITY
Start: 2022-10-26 | End: 2022-11-05

## 2022-10-26 NOTE — PROGRESS NOTES
Subjective:       Patient ID: Wilver Cee Jr. is a 37 y.o. male.    Chief Complaint: Cough (Tested positive to flu on yesterday), Fever, Nasal Congestion, Nausea, Fatigue, Dizziness, and Headache    Here for follow up from urgent care.   He tested positive for flu yesterday and was given Rx for tamiflu but parents didn't want to start d/t side effects, especially N/V because he is already having issues with that.      Cough  This is a new problem. The current episode started in the past 7 days. Associated symptoms include chills, eye redness, a fever (104.7 axillary), headaches, myalgias, nasal congestion, postnasal drip, rhinorrhea, a sore throat and shortness of breath. Pertinent negatives include no chest pain, ear pain, rash or wheezing. Associated symptoms comments: N/V/D, a lot of times it is post tussive.  Seems to be more mucous and gag reflex.  . There is no history of environmental allergies.   Review of Systems   Constitutional:  Positive for activity change, appetite change, chills, fatigue and fever (104.7 axillary). Negative for diaphoresis and unexpected weight change.   HENT:  Positive for congestion (head and chest), postnasal drip, rhinorrhea, sinus pressure, sinus pain, sneezing, sore throat and voice change. Negative for ear discharge, ear pain, hearing loss, nosebleeds, tinnitus and trouble swallowing.    Eyes:  Positive for redness. Negative for photophobia, pain, discharge, itching and visual disturbance.   Respiratory:  Positive for cough and shortness of breath. Negative for apnea, choking, chest tightness and wheezing.    Cardiovascular:  Negative for chest pain, palpitations and leg swelling.   Gastrointestinal:  Positive for diarrhea, nausea and vomiting (phlegm). Negative for abdominal distention, abdominal pain, blood in stool and constipation.   Endocrine: Negative for cold intolerance, heat intolerance, polydipsia and polyuria.   Genitourinary:  Positive for frequency. Negative  for decreased urine volume, difficulty urinating, dysuria, enuresis, flank pain, genital sores, hematuria, penile discharge, penile pain, scrotal swelling, testicular pain and urgency.   Musculoskeletal:  Positive for arthralgias and myalgias. Negative for back pain, gait problem, joint swelling, neck pain and neck stiffness.   Skin:  Negative for rash and wound.   Allergic/Immunologic: Negative for environmental allergies, food allergies and immunocompromised state.   Neurological:  Positive for dizziness, syncope, speech difficulty, weakness, light-headedness and headaches. Negative for tremors, seizures, facial asymmetry and numbness.   Hematological:  Negative for adenopathy. Does not bruise/bleed easily.   Psychiatric/Behavioral:  Positive for sleep disturbance (coughing). Negative for confusion, decreased concentration, hallucinations, self-injury and suicidal ideas. The patient is nervous/anxious.      Past Medical History:   Diagnosis Date    Allergy     Autistic disorder     Cyclic vomiting syndrome 11/11/2020    Hyperlipidemia     Thrombocytopenia 8/19/2021    Urticaria       Past Surgical History:   Procedure Laterality Date    ADENOIDECTOMY      MULTIPLE TOOTH EXTRACTIONS      TYMPANOSTOMY TUBE PLACEMENT         Family History   Problem Relation Age of Onset    Epilepsy Mother     Hypertension Father     Heart attack Father 54    No Known Problems Sister     No Known Problems Brother     No Known Problems Daughter     No Known Problems Son     No Known Problems Maternal Aunt     No Known Problems Maternal Uncle     No Known Problems Paternal Aunt     No Known Problems Paternal Uncle     No Known Problems Maternal Grandmother     No Known Problems Maternal Grandfather     No Known Problems Paternal Grandmother     No Known Problems Paternal Grandfather        Social History     Socioeconomic History    Marital status: Single   Occupational History    Occupation: disabled   Tobacco Use    Smoking status:  "Never    Smokeless tobacco: Never   Substance and Sexual Activity    Alcohol use: No    Drug use: No    Sexual activity: Never   Social History Narrative    Live with father       Current Outpatient Medications   Medication Sig Dispense Refill    ascorbic Acid (VITAMIN C) 500 mg CpSR Take 500 mg by mouth once daily.      levalbuterol (XOPENEX) 1.25 mg/3 mL nebulizer solution Take 3 mLs (1.25 mg total) by nebulization every 4 (four) hours as needed for Wheezing. Rescue 25 each 1    loratadine (CLARITIN) 10 mg tablet Take 10 mg by mouth daily as needed for Allergies.      acetaminophen (TYLENOL) 500 MG tablet Take 1 tablet (500 mg total) by mouth every 4 (four) hours as needed for Pain or Temperature greater than (100.4).  0    cetirizine-pseudoephedrine 5-120 mg Tb12 Take 1 tablet by mouth 2 (two) times a day. for 10 days  0    ibuprofen (ADVIL,MOTRIN) 200 MG tablet Take 2 tablets (400 mg total) by mouth every 6 (six) hours as needed for Pain or Temperature greater than (100.4).  0    promethazine-dextromethorphan (PROMETHAZINE-DM) 6.25-15 mg/5 mL Syrp Take 5 mLs by mouth every 4 (four) hours as needed (cough). 180 mL 1    vitamin D (VITAMIN D3) 1000 units Tab Take 2,000 Units by mouth once daily.       No current facility-administered medications for this visit.       Review of patient's allergies indicates:  No Active Allergies    Objective:    HPI     Cough     Additional comments: Tested positive to flu on yesterday          Last edited by Cris Crockett MA on 10/26/2022  1:34 PM.      Blood pressure 102/60, pulse 92, temperature 99.3 °F (37.4 °C), temperature source Temporal, height 5' 9" (1.753 m), weight 97.1 kg (214 lb), SpO2 95 %. Body mass index is 31.6 kg/m².   Physical Exam  Constitutional:       General: He is not in acute distress.     Appearance: He is well-developed. He is not ill-appearing, toxic-appearing or diaphoretic.   HENT:      Head: Normocephalic.      Right Ear: Tympanic membrane, ear canal " and external ear normal.      Left Ear: Tympanic membrane, ear canal and external ear normal.      Nose: Nose normal. No rhinorrhea.      Right Sinus: No maxillary sinus tenderness or frontal sinus tenderness.      Left Sinus: No maxillary sinus tenderness or frontal sinus tenderness.      Mouth/Throat:      Pharynx: No oropharyngeal exudate or posterior oropharyngeal erythema.      Tonsils: No tonsillar exudate.   Cardiovascular:      Rate and Rhythm: Normal rate and regular rhythm.      Heart sounds: Normal heart sounds. No murmur heard.    No friction rub. No gallop.   Pulmonary:      Effort: Pulmonary effort is normal. No tachypnea, accessory muscle usage or respiratory distress.      Breath sounds: Normal breath sounds. No wheezing, rhonchi or rales.   Neurological:      Mental Status: He is alert.           Assessment:       1. Influenza        Plan:       Wilver was seen today for cough, fever, nasal congestion, nausea, fatigue, dizziness and headache.    Diagnoses and all orders for this visit:    Influenza  Comments:  Alternate tylenol/NSAIDs.  Decongestant should help with postnasal drip and cough    Other orders  -     promethazine-dextromethorphan (PROMETHAZINE-DM) 6.25-15 mg/5 mL Syrp; Take 5 mLs by mouth every 4 (four) hours as needed (cough).  -     cetirizine-pseudoephedrine 5-120 mg Tb12; Take 1 tablet by mouth 2 (two) times a day. for 10 days  -     acetaminophen (TYLENOL) 500 MG tablet; Take 1 tablet (500 mg total) by mouth every 4 (four) hours as needed for Pain or Temperature greater than (100.4).  -     ibuprofen (ADVIL,MOTRIN) 200 MG tablet; Take 2 tablets (400 mg total) by mouth every 6 (six) hours as needed for Pain or Temperature greater than (100.4).

## 2022-10-28 DIAGNOSIS — F84.0 AUTISTIC DISORDER: ICD-10-CM

## 2022-10-28 DIAGNOSIS — J11.1 INFLUENZA: Primary | ICD-10-CM

## 2022-10-28 RX ORDER — LEVALBUTEROL INHALATION SOLUTION 1.25 MG/3ML
1 SOLUTION RESPIRATORY (INHALATION) EVERY 4 HOURS PRN
Qty: 25 EACH | Refills: 1 | Status: SHIPPED | OUTPATIENT
Start: 2022-10-28 | End: 2023-03-06

## 2022-10-28 RX ORDER — LEVALBUTEROL TARTRATE 45 UG/1
1-2 AEROSOL, METERED ORAL EVERY 4 HOURS PRN
Qty: 15 G | Refills: 0 | Status: SHIPPED | OUTPATIENT
Start: 2022-10-28 | End: 2023-03-06

## 2022-10-28 NOTE — TELEPHONE ENCOUNTER
Judy from [facility] called and said pt's levalbuterol is not covered by his insurance. Called pt's father and he said there was an error with the was his insurance was put in at the pharmacy, and was told they are trying to fix it. He is concerned about this not being fixed before we closed for the weekend and was inquiring if there was an inhaler form of levalbuterol that could be sent while waiting to see if coverage for neb solution is resolved. He would like a spacer rx'ed too if inhaler is something you would consider.

## 2022-10-31 ENCOUNTER — PATIENT MESSAGE (OUTPATIENT)
Dept: FAMILY MEDICINE | Facility: CLINIC | Age: 37
End: 2022-10-31

## 2022-11-01 ENCOUNTER — OFFICE VISIT (OUTPATIENT)
Dept: FAMILY MEDICINE | Facility: CLINIC | Age: 37
End: 2022-11-01
Payer: MEDICARE

## 2022-11-01 VITALS
TEMPERATURE: 98 F | HEART RATE: 81 BPM | WEIGHT: 207.19 LBS | HEIGHT: 69 IN | SYSTOLIC BLOOD PRESSURE: 98 MMHG | BODY MASS INDEX: 30.69 KG/M2 | DIASTOLIC BLOOD PRESSURE: 68 MMHG | RESPIRATION RATE: 18 BRPM | OXYGEN SATURATION: 99 %

## 2022-11-01 DIAGNOSIS — J40 BRONCHITIS: Primary | ICD-10-CM

## 2022-11-01 PROCEDURE — 99213 PR OFFICE/OUTPT VISIT, EST, LEVL III, 20-29 MIN: ICD-10-PCS | Mod: AQ,S$GLB,, | Performed by: INTERNAL MEDICINE

## 2022-11-01 PROCEDURE — 99213 OFFICE O/P EST LOW 20 MIN: CPT | Mod: AQ,S$GLB,, | Performed by: INTERNAL MEDICINE

## 2022-11-01 RX ORDER — AZITHROMYCIN 250 MG/1
TABLET, FILM COATED ORAL
Qty: 6 TABLET | Refills: 0 | Status: SHIPPED | OUTPATIENT
Start: 2022-11-01 | End: 2022-11-06

## 2022-11-01 RX ORDER — ACETYLCYSTEINE 600 MG
600 CAPSULE ORAL 2 TIMES DAILY
Qty: 10 CAPSULE | Refills: 0 | Status: SHIPPED | OUTPATIENT
Start: 2022-11-01 | End: 2022-11-06

## 2022-11-01 NOTE — PROGRESS NOTES
Subjective:       Patient ID: Wilver Cee Jr. is a 37 y.o. male.    Chief Complaint: Cough and Congestion    Here for follow up.  He is still having a lot of issues with cough and mucous.  Cough is very wet sounding and it sounds like he is drowning in mucous.  He has a hard time coughing stuff up d/t his mental faculties.  No fever.  Coughing seems to hurt him; chest is sore.      Review of Systems   Constitutional: Negative.  Negative for activity change, appetite change, chills, diaphoresis, fatigue, fever and unexpected weight change.   HENT:  Negative for congestion, ear discharge, ear pain, hearing loss, nosebleeds, postnasal drip, rhinorrhea, sinus pressure, sinus pain, sneezing, sore throat, tinnitus, trouble swallowing and voice change.    Eyes:  Negative for photophobia, pain, discharge, redness, itching and visual disturbance.   Respiratory:  Positive for cough and choking (d/t excess mucus). Negative for apnea, chest tightness, shortness of breath and wheezing.    Cardiovascular:  Positive for chest pain (from coughing). Negative for palpitations and leg swelling.   Gastrointestinal:  Positive for diarrhea (from medications and possibly swallowing mucus, but is improving). Negative for abdominal distention, abdominal pain, blood in stool, constipation, nausea and vomiting.   Endocrine: Negative.  Negative for cold intolerance, heat intolerance, polydipsia and polyuria.   Genitourinary:  Negative for decreased urine volume, difficulty urinating, dysuria, enuresis, flank pain, frequency, genital sores, hematuria, penile discharge, penile pain, scrotal swelling, testicular pain and urgency.   Musculoskeletal:  Positive for back pain (d/t coughing). Negative for arthralgias, gait problem, joint swelling, myalgias, neck pain and neck stiffness.   Skin:  Negative for rash and wound.   Allergic/Immunologic: Negative.  Negative for environmental allergies, food allergies and immunocompromised state.    Neurological: Negative.  Negative for dizziness, tremors, seizures, syncope, facial asymmetry, speech difficulty, weakness, light-headedness, numbness and headaches.   Hematological: Negative.  Negative for adenopathy. Does not bruise/bleed easily.   Psychiatric/Behavioral: Negative.  Negative for confusion, decreased concentration, hallucinations, self-injury, sleep disturbance and suicidal ideas. The patient is not nervous/anxious.      Past Medical History:   Diagnosis Date    Allergy     Autistic disorder     Cyclic vomiting syndrome 11/11/2020    Hyperlipidemia     Thrombocytopenia 8/19/2021    Urticaria       Past Surgical History:   Procedure Laterality Date    ADENOIDECTOMY      MULTIPLE TOOTH EXTRACTIONS      TYMPANOSTOMY TUBE PLACEMENT         Family History   Problem Relation Age of Onset    Epilepsy Mother     Hypertension Father     Heart attack Father 54    No Known Problems Sister     No Known Problems Brother     No Known Problems Daughter     No Known Problems Son     No Known Problems Maternal Aunt     No Known Problems Maternal Uncle     No Known Problems Paternal Aunt     No Known Problems Paternal Uncle     No Known Problems Maternal Grandmother     No Known Problems Maternal Grandfather     No Known Problems Paternal Grandmother     No Known Problems Paternal Grandfather        Social History     Socioeconomic History    Marital status: Single   Occupational History    Occupation: disabled   Tobacco Use    Smoking status: Never    Smokeless tobacco: Never   Substance and Sexual Activity    Alcohol use: No    Drug use: No    Sexual activity: Never   Social History Narrative    Live with father       Current Outpatient Medications   Medication Sig Dispense Refill    acetaminophen (TYLENOL) 500 MG tablet Take 1 tablet (500 mg total) by mouth every 4 (four) hours as needed for Pain or Temperature greater than (100.4).  0    ascorbic Acid (VITAMIN C) 500 mg CpSR Take 500 mg by mouth once daily.    "   cetirizine-pseudoephedrine 5-120 mg Tb12 Take 1 tablet by mouth 2 (two) times a day. for 10 days  0    ibuprofen (ADVIL,MOTRIN) 200 MG tablet Take 2 tablets (400 mg total) by mouth every 6 (six) hours as needed for Pain or Temperature greater than (100.4).  0    levalbuterol (XOPENEX) 1.25 mg/3 mL nebulizer solution Take 3 mLs (1.25 mg total) by nebulization every 4 (four) hours as needed for Wheezing. Rescue 25 each 1    loratadine (CLARITIN) 10 mg tablet Take 10 mg by mouth daily as needed for Allergies.      vitamin D (VITAMIN D3) 1000 units Tab Take 2,000 Units by mouth once daily.      acetylcysteine (NAC) 600 mg Cap Take 1 capsule (600 mg total) by mouth 2 (two) times a day. for 5 days 10 capsule 0    azithromycin (Z-RUDDY) 250 MG tablet Take 2 tablets by mouth on day 1; Take 1 tablet by mouth on days 2-5 6 tablet 0    levalbuterol (XOPENEX HFA) 45 mcg/actuation inhaler Inhale 1-2 puffs into the lungs every 4 (four) hours as needed for Wheezing. Rescue (Patient not taking: Reported on 11/1/2022) 15 g 0     No current facility-administered medications for this visit.       Review of patient's allergies indicates:   Allergen Reactions    Promethazine-dm Other (See Comments)     aggressive behavior, anxiety,  confusion, irritability, dizziness, and diarrhea.     Objective:      Blood pressure 98/68, pulse 81, temperature 98.1 °F (36.7 °C), resp. rate 18, height 5' 9" (1.753 m), weight 94 kg (207 lb 3.2 oz), SpO2 99 %. Body mass index is 30.6 kg/m².   Physical Exam  Vitals and nursing note reviewed.   Constitutional:       General: He is not in acute distress.     Appearance: Normal appearance. He is obese. He is not ill-appearing, toxic-appearing or diaphoretic.   Cardiovascular:      Rate and Rhythm: Normal rate.      Heart sounds: Normal heart sounds.   Pulmonary:      Effort: Pulmonary effort is normal. No respiratory distress.      Breath sounds: Rhonchi present. No wheezing or rales.   Neurological:      " Mental Status: He is alert.           Assessment:       1. Bronchitis        Plan:       Wilver was seen today for cough and congestion.    Diagnoses and all orders for this visit:    Bronchitis  -     acetylcysteine (NAC) 600 mg Cap; Take 1 capsule (600 mg total) by mouth 2 (two) times a day. for 5 days  -     azithromycin (Z-RUDDY) 250 MG tablet; Take 2 tablets by mouth on day 1; Take 1 tablet by mouth on days 2-5

## 2022-11-04 ENCOUNTER — OFFICE VISIT (OUTPATIENT)
Dept: FAMILY MEDICINE | Facility: CLINIC | Age: 37
End: 2022-11-04
Payer: MEDICARE

## 2022-11-04 VITALS
WEIGHT: 211.19 LBS | BODY MASS INDEX: 31.28 KG/M2 | DIASTOLIC BLOOD PRESSURE: 68 MMHG | TEMPERATURE: 98 F | HEART RATE: 96 BPM | SYSTOLIC BLOOD PRESSURE: 118 MMHG | HEIGHT: 69 IN | OXYGEN SATURATION: 97 %

## 2022-11-04 DIAGNOSIS — F84.0 AUTISTIC DISORDER: ICD-10-CM

## 2022-11-04 DIAGNOSIS — M54.50 ACUTE BILATERAL LOW BACK PAIN WITHOUT SCIATICA: Primary | ICD-10-CM

## 2022-11-04 PROCEDURE — 99213 OFFICE O/P EST LOW 20 MIN: CPT | Mod: PBBFAC,PO | Performed by: NURSE PRACTITIONER

## 2022-11-04 PROCEDURE — 99999 PR PBB SHADOW E&M-EST. PATIENT-LVL III: ICD-10-PCS | Mod: PBBFAC,,, | Performed by: NURSE PRACTITIONER

## 2022-11-04 PROCEDURE — 99213 PR OFFICE/OUTPT VISIT, EST, LEVL III, 20-29 MIN: ICD-10-PCS | Mod: S$PBB,,, | Performed by: NURSE PRACTITIONER

## 2022-11-04 PROCEDURE — 99999 PR PBB SHADOW E&M-EST. PATIENT-LVL III: CPT | Mod: PBBFAC,,, | Performed by: NURSE PRACTITIONER

## 2022-11-04 PROCEDURE — 99213 OFFICE O/P EST LOW 20 MIN: CPT | Mod: S$PBB,,, | Performed by: NURSE PRACTITIONER

## 2022-11-07 NOTE — PROGRESS NOTES
"Subjective:       Patient ID: Wilver Cee Jr. is a 37 y.o. male.    Chief Complaint: Back Pain and Cough    Chief Complaint  Chief Complaint   Patient presents with    Back Pain    Cough       HPI  Wilver Cee Jr. is a 37 y.o. male with medical diagnoses as listed in the medical history and problem list that presents for low back pain. His dad presents with him and helps with the history.  He recently was sick with the flu and has a persistent cough.  His pcp prescribed a zpack and it seems the cough is improving. The back pain has been present for about one week, he is unable to quantify if with a pain level.  His father notes that he is "grimacing" when changing positions. He suspects maybe with  all the forceful coughing while sick that the patient may have injured his back.   It is primarily on the lower right side.  The patient has not complained of anything else.  The father is concerned for bladder/kidney cause.  Urine dip done today was negative.   Established patient with last clinic appointment Visit date not found.        PAST MEDICAL HISTORY:  Past Medical History:   Diagnosis Date    Allergy     Autistic disorder     Cyclic vomiting syndrome 11/11/2020    Hyperlipidemia     Thrombocytopenia 8/19/2021    Urticaria        PAST SURGICAL HISTORY:  Past Surgical History:   Procedure Laterality Date    ADENOIDECTOMY      MULTIPLE TOOTH EXTRACTIONS      TYMPANOSTOMY TUBE PLACEMENT         SOCIAL HISTORY:  Social History     Socioeconomic History    Marital status: Single   Occupational History    Occupation: disabled   Tobacco Use    Smoking status: Never    Smokeless tobacco: Never   Substance and Sexual Activity    Alcohol use: No    Drug use: No    Sexual activity: Never   Social History Narrative    Live with father       FAMILY HISTORY:  Family History   Problem Relation Age of Onset    Epilepsy Mother     Hypertension Father     Heart attack Father 54    No Known Problems Sister     No " Known Problems Brother     No Known Problems Daughter     No Known Problems Son     No Known Problems Maternal Aunt     No Known Problems Maternal Uncle     No Known Problems Paternal Aunt     No Known Problems Paternal Uncle     No Known Problems Maternal Grandmother     No Known Problems Maternal Grandfather     No Known Problems Paternal Grandmother     No Known Problems Paternal Grandfather        ALLERGIES AND MEDICATIONS: updated and reviewed.  Review of patient's allergies indicates:   Allergen Reactions    Promethazine-dm Other (See Comments)     aggressive behavior, anxiety,  confusion, irritability, dizziness, and diarrhea.     Current Outpatient Medications   Medication Sig Dispense Refill    acetaminophen (TYLENOL) 500 MG tablet Take 1 tablet (500 mg total) by mouth every 4 (four) hours as needed for Pain or Temperature greater than (100.4).  0    ascorbic Acid (VITAMIN C) 500 mg CpSR Take 500 mg by mouth once daily.      ibuprofen (ADVIL,MOTRIN) 200 MG tablet Take 2 tablets (400 mg total) by mouth every 6 (six) hours as needed for Pain or Temperature greater than (100.4).  0    levalbuterol (XOPENEX) 1.25 mg/3 mL nebulizer solution Take 3 mLs (1.25 mg total) by nebulization every 4 (four) hours as needed for Wheezing. Rescue 25 each 1    loratadine (CLARITIN) 10 mg tablet Take 10 mg by mouth daily as needed for Allergies.      vitamin D (VITAMIN D3) 1000 units Tab Take 2,000 Units by mouth once daily.      levalbuterol (XOPENEX HFA) 45 mcg/actuation inhaler Inhale 1-2 puffs into the lungs every 4 (four) hours as needed for Wheezing. Rescue (Patient not taking: Reported on 11/1/2022) 15 g 0     No current facility-administered medications for this visit.       I have reviewed the patient's medical, family, and social history in detail and updated the computerized patient record.    Review of Systems   Unable to perform ROS: Patient nonverbal       Objective:      Vitals:    11/04/22 1619   BP: 118/68  "  Pulse: 96   Temp: 97.5 °F (36.4 °C)   TempSrc: Oral   SpO2: 97%   Weight: 95.8 kg (211 lb 3.2 oz)   Height: 5' 9" (1.753 m)     Physical Exam  HENT:      Head: Normocephalic and atraumatic.   Eyes:      Pupils: Pupils are equal, round, and reactive to light.   Cardiovascular:      Rate and Rhythm: Normal rate and regular rhythm.      Pulses: Normal pulses.      Heart sounds: Normal heart sounds.   Pulmonary:      Effort: Pulmonary effort is normal. No respiratory distress.      Breath sounds: Normal breath sounds. No stridor. No wheezing, rhonchi or rales.   Chest:      Chest wall: No tenderness.   Abdominal:      General: Abdomen is flat. There is no distension.      Palpations: Abdomen is soft. There is no mass.      Tenderness: There is no abdominal tenderness. There is no right CVA tenderness, left CVA tenderness, guarding or rebound.      Hernia: No hernia is present.   Musculoskeletal:      Cervical back: Normal range of motion.   Skin:     General: Skin is warm and dry.   Neurological:      Mental Status: He is alert.         Assessment:       1. Acute bilateral low back pain without sciatica    2. Autistic disorder          Plan:       Wilver was seen today for back pain and cough.    Diagnoses and all orders for this visit:    Acute bilateral low back pain without sciatica  -     POCT URINE DIPSTICK WITHOUT MICROSCOPE  -Urine dip negative, do not suspect UTI/Kidney origin from exam   -likely pulled muscle following forceful coughing when sick  -offered prescription for muscle relaxant- declined at this time  -Advised warm moist heat to area, tylenol/ibuprofen as needed   -if symptoms worsen or do not improve notify office for re-evaluation   Autistic disorder  -Stable, continue current level of care   No follow-ups on file.            "

## 2022-11-11 DIAGNOSIS — M54.50 ACUTE RIGHT-SIDED LOW BACK PAIN, UNSPECIFIED WHETHER SCIATICA PRESENT: Primary | ICD-10-CM

## 2022-11-11 RX ORDER — TIZANIDINE 4 MG/1
4 TABLET ORAL EVERY 6 HOURS PRN
Qty: 30 TABLET | Refills: 0 | Status: SHIPPED | OUTPATIENT
Start: 2022-11-11 | End: 2022-11-21

## 2022-11-18 ENCOUNTER — OFFICE VISIT (OUTPATIENT)
Dept: ORTHOPEDICS | Facility: CLINIC | Age: 37
End: 2022-11-18
Payer: MEDICARE

## 2022-11-18 VITALS
WEIGHT: 211 LBS | DIASTOLIC BLOOD PRESSURE: 62 MMHG | SYSTOLIC BLOOD PRESSURE: 119 MMHG | HEIGHT: 69 IN | BODY MASS INDEX: 31.25 KG/M2

## 2022-11-18 DIAGNOSIS — M47.816 FACET ARTHRITIS OF LUMBAR REGION: ICD-10-CM

## 2022-11-18 DIAGNOSIS — M51.36 DDD (DEGENERATIVE DISC DISEASE), LUMBAR: Primary | ICD-10-CM

## 2022-11-18 DIAGNOSIS — M94.0 ACUTE COSTOCHONDRITIS: ICD-10-CM

## 2022-11-18 DIAGNOSIS — M43.16 SPONDYLOLISTHESIS OF LUMBAR REGION: ICD-10-CM

## 2022-11-18 PROCEDURE — 99203 PR OFFICE/OUTPT VISIT, NEW, LEVL III, 30-44 MIN: ICD-10-PCS | Mod: S$GLB,,, | Performed by: PHYSICIAN ASSISTANT

## 2022-11-18 PROCEDURE — 99203 OFFICE O/P NEW LOW 30 MIN: CPT | Mod: S$GLB,,, | Performed by: PHYSICIAN ASSISTANT

## 2022-11-18 NOTE — PROGRESS NOTES
Subjective:       Patient ID: Wilver Cee Jr. is a 37 y.o. male.    Chief Complaint: Pain of the Lumbar Spine (Pt is non verbal. Caregiver states that he has been exhibiting sign of back pain for a few weeks after a severe bout of the Flu.)      History of Present Illness    Prior to meeting with the patient I reviewed the medical chart in [facility]. This included reviewing the previous progress notes from our office, review of the patient's last appointment with their primary care provider, review of any visits to the emergency room, and review of any pain management appointments or procedures.   Patient is here for initial evaluation.  The patient is accompanied by his father.  The patient does have some cognitive disability with limited communication skills.  Therefore his father is able to explain why he is here today.  The chief complaint is some midline mid to lower thoracic pain that started a few weeks ago after a bout of the flu followed by some significant coughing.  The father states that the coughing is what precipitated the pain.  Since the coughing has resolved the patient is continue to demonstrate behavior indicating that he continues to have thoracic pain; such as grabbing his back and a labored or antalgic sit to stand.  Since the onset of symptoms the patient was seen by chiropractor for evaluation and 3 treatments for muscle soreness and spasm.  He was then seen by is nurse practitioner who prescribed some Zanaflex.  The patient receives 2 mg Zanaflex at night as needed and this seems of helped.  The patient's father states that overall his symptoms have improved over the last few days.  His behavior indicates this.  Patient is smiling and back to his normal joyful attitude.  Does not demonstrate antalgic movements with normal activities.    Current Medications  Current Outpatient Medications   Medication Sig Dispense Refill    acetaminophen (TYLENOL) 500 MG tablet Take 1 tablet (500 mg total)  by mouth every 4 (four) hours as needed for Pain or Temperature greater than (100.4).  0    ascorbic Acid (VITAMIN C) 500 mg CpSR Take 500 mg by mouth once daily.      loratadine (CLARITIN) 10 mg tablet Take 10 mg by mouth daily as needed for Allergies.      tiZANidine (ZANAFLEX) 4 MG tablet Take 1 tablet (4 mg total) by mouth every 6 (six) hours as needed (back pain). 30 tablet 0    vitamin D (VITAMIN D3) 1000 units Tab Take 2,000 Units by mouth once daily.      ibuprofen (ADVIL,MOTRIN) 200 MG tablet Take 2 tablets (400 mg total) by mouth every 6 (six) hours as needed for Pain or Temperature greater than (100.4). (Patient not taking: Reported on 11/18/2022)  0    levalbuterol (XOPENEX HFA) 45 mcg/actuation inhaler Inhale 1-2 puffs into the lungs every 4 (four) hours as needed for Wheezing. Rescue (Patient not taking: Reported on 11/1/2022) 15 g 0    levalbuterol (XOPENEX) 1.25 mg/3 mL nebulizer solution Take 3 mLs (1.25 mg total) by nebulization every 4 (four) hours as needed for Wheezing. Rescue (Patient not taking: Reported on 11/18/2022) 25 each 1     No current facility-administered medications for this visit.       Allergies  Review of patient's allergies indicates:   Allergen Reactions    Promethazine-dm Other (See Comments)     aggressive behavior, anxiety,  confusion, irritability, dizziness, and diarrhea.       Past Medical History  Past Medical History:   Diagnosis Date    Allergy     Autistic disorder     Cyclic vomiting syndrome 11/11/2020    Hyperlipidemia     Thrombocytopenia 8/19/2021    Urticaria        Surgical History  Past Surgical History:   Procedure Laterality Date    ADENOIDECTOMY      MULTIPLE TOOTH EXTRACTIONS      TYMPANOSTOMY TUBE PLACEMENT         Family History:   Family History   Problem Relation Age of Onset    Epilepsy Mother     Hypertension Father     Heart attack Father 54    No Known Problems Sister     No Known Problems Brother     No Known Problems Daughter     No Known  Problems Son     No Known Problems Maternal Aunt     No Known Problems Maternal Uncle     No Known Problems Paternal Aunt     No Known Problems Paternal Uncle     No Known Problems Maternal Grandmother     No Known Problems Maternal Grandfather     No Known Problems Paternal Grandmother     No Known Problems Paternal Grandfather        Social History:   Social History     Socioeconomic History    Marital status: Single   Occupational History    Occupation: disabled   Tobacco Use    Smoking status: Never    Smokeless tobacco: Never   Substance and Sexual Activity    Alcohol use: No    Drug use: No    Sexual activity: Never   Social History Narrative    Live with father       Hospitalization/Major Diagnostic Procedure:     Review of Systems     General/Constitutional:  Chills denies. Fatigue denies. Fever denies. Weight gain denies. Weight loss denies.    Respiratory:  Shortness of breath denies.    Cardiovascular:  Chest pain denies.    Gastrointestinal:  Constipation denies. Diarrhea denies. Nausea denies. Vomiting denies.     Hematology:  Easy bruising denies. Prolonged bleeding denies.     Genitourinary:  Frequent urination denies. Pain in lower back denies. Painful urination denies.     Musculoskeletal:  See HPI for details    Skin:  Rash denies.    Neurologic:  Dizziness denies. Gait abnormalities denies. Seizures denies. Tingling/Numbess denies.    Psychiatric:  Anxiety denies. Depressed mood denies.     Objective:   Vital Signs:   Vitals:    11/18/22 1138   BP: 119/62        Physical Exam      General Examination:     Constitutional: The patient is alert and oriented to lace person and time. Mood is pleasant.     Head/Face: Normal facial features normal eyebrows    Eyes: Normal extraocular motion bilaterally    Lungs: Respirations are equal and unlabored    Gait is coordinated.    Cardiovascular: There are no swelling or varicosities present.    Lymphatic: Negative for adenopathy    Skin:  Normal    Neurological: Level of consciousness normal. Oriented to place person and time and situation    Psychiatric: Oriented to time place person and situation    On physical exam I palpated his entire thoracic spine and rib cage in could not find anyone particular spot that was painful form.  Lumbar spine is nonpainful with no tenderness palpation either.    XRAY Report/ Interpretation:  AP pelvis x-ray taken in the office today and reviewed with the patient demonstrates no significant abnormality.  However there is some internal rotation of the right iliac crest.    Lumbar AP and lateral x-rays taken in the office today reviewed the patient demonstrates decreased disc height at L4-5 and L5-S1 with a grade 1 retrolisthesis at L3-4 and some corresponding facet sclerosis L3 to the sacrum.    Thoracic AP and lateral x-rays taken in the office today and reviewed with the patient demonstrate a fair amount of scoliosis      Assessment:       1. DDD (degenerative disc disease), lumbar    2. Spondylolisthesis of lumbar region    3. Facet arthritis of lumbar region    4. Acute costochondritis          Plan:       Wilver was seen today for pain.    Diagnoses and all orders for this visit:    DDD (degenerative disc disease), lumbar  -     X-Ray Lumbar Spine Ap And Lateral  -     X-Ray Pelvis Routine AP  -     X-Ray Thoracic Spine AP Lateral    Spondylolisthesis of lumbar region  -     Ambulatory referral/consult to Orthopedics  -     X-Ray Thoracic Spine AP Lateral    Facet arthritis of lumbar region  -     X-Ray Thoracic Spine AP Lateral    Acute costochondritis  -     X-Ray Thoracic Spine AP Lateral       No follow-ups on file.    Patient's symptoms and subjective details indicate a bout of acute costochondritis that is currently resolving.  The best treatment for this is NSAIDs.  He may continue to take the Zanaflex 2 mg q.h.s. as needed.  If his symptoms are not completely resolve within 2 weeks and I would recommend  some physical therapy.  I also offered IM corticosteroid but the patient's father declined.    Treatment options were discussed with regards to the nature of the medical condition. Conservative pain intervention and surgical options were discussed in detail. The probability of success of each separate treatment option was discussed. The patient expressed a clear understanding of the treatment options. With regards to surgery, the procedure risk, benefits, complications, and outcomes were discussed. No guarantees were given with regards to surgical outcome.   The risk of complications, morbidity, and mortality of patient management decisions have been made at the time of this visit. These are associated with the patient's problems, diagnostic procedures and treatment options. This includes the possible management options selected and those considered but not selected by the patient after shared medical decision making we discussed with the patient.     This note was created using Dragon voice recognition software that occasionally misinterpreted phrases or words.

## 2022-12-12 ENCOUNTER — PATIENT MESSAGE (OUTPATIENT)
Dept: ORTHOPEDICS | Facility: CLINIC | Age: 37
End: 2022-12-12

## 2022-12-12 DIAGNOSIS — M43.16 SPONDYLOLISTHESIS OF LUMBAR REGION: ICD-10-CM

## 2022-12-12 DIAGNOSIS — M47.816 FACET ARTHRITIS OF LUMBAR REGION: ICD-10-CM

## 2022-12-12 DIAGNOSIS — M51.36 DDD (DEGENERATIVE DISC DISEASE), LUMBAR: Primary | ICD-10-CM

## 2022-12-27 NOTE — PROGRESS NOTES
"[facility] Hematology/Oncology  PROGRESS NOTE -  Follow-up Visit      Subjective:       Patient ID:   NAME: Wilver Cee Jr. : 1985     37 y.o. male    Referring Doc: Saji Eason, *  Other Physicians: Greg Smith; Juan; Roxanne; Jimenez Solis  (ENT)           Chief Complaint: tcp f/u       History of Present Illness:     Patient returns today for a regularly scheduled follow-up visit.  The patient is here today to go over the results of the recently ordered labs, tests and studies. He is here with his dad.       He is feeling ok. He reports that he is "good". No excessive bleeding and bruising. No CP, SOB, HA's or N/V.    He has had a bout of hives since about 2022 and sees Dr Laureano and Dr Martinez    He had covid in 2022    He saw Dr Nix on 2022    He saw Dr Bee with ortho for his lower back issues with DDD    Discussed covid precautions and he has had his vaccinations            ROS:   GEN: normal without any fever, night sweats or weight loss; chronic hives and LBP  HEENT: normal with no HA's, sore throat, stiff neck, changes in vision  CV: normal with no CP, SOB, PND, CRUZ or orthopnea  PULM: normal with no SOB, cough, hemoptysis, sputum or pleuritic pain  GI: normal with no abdominal pain, nausea, vomiting, constipation, diarrhea, melanotic stools, BRBPR, or hematemesis  : normal with no hematuria, dysuria  BREAST: normal with no mass, discharge, pain  SKIN: normal with no rash, erythema, bruising, or swelling    Pain Scale: 0    Allergies:  Review of patient's allergies indicates:   Allergen Reactions    Promethazine-dm Other (See Comments)     aggressive behavior, anxiety,  confusion, irritability, dizziness, and diarrhea.       Medications:    Current Outpatient Medications:     acetaminophen (TYLENOL) 500 MG tablet, Take 1 tablet (500 mg total) by mouth every 4 (four) hours as needed for Pain or Temperature greater than (100.4)., Disp: , Rfl: 0    ascorbic " "Acid (VITAMIN C) 500 mg CpSR, Take 500 mg by mouth once daily., Disp: , Rfl:     loratadine (CLARITIN) 10 mg tablet, Take 10 mg by mouth daily as needed for Allergies., Disp: , Rfl:     vitamin D (VITAMIN D3) 1000 units Tab, Take 2,000 Units by mouth once daily., Disp: , Rfl:     ibuprofen (ADVIL,MOTRIN) 200 MG tablet, Take 2 tablets (400 mg total) by mouth every 6 (six) hours as needed for Pain or Temperature greater than (100.4). (Patient not taking: Reported on 11/18/2022), Disp: , Rfl: 0    levalbuterol (XOPENEX HFA) 45 mcg/actuation inhaler, Inhale 1-2 puffs into the lungs every 4 (four) hours as needed for Wheezing. Rescue (Patient not taking: Reported on 11/1/2022), Disp: 15 g, Rfl: 0    levalbuterol (XOPENEX) 1.25 mg/3 mL nebulizer solution, Take 3 mLs (1.25 mg total) by nebulization every 4 (four) hours as needed for Wheezing. Rescue (Patient not taking: Reported on 11/18/2022), Disp: 25 each, Rfl: 1    predniSONE (DELTASONE) 10 MG tablet, , Disp: , Rfl:     PMHx/PSHx Updates:  See patient's last visit with me on 12/29/2021.  See H&P on 8/20/2021        Pathology:   Cancer Staging   No matching staging information was found for the patient.          Objective:     Vitals:  Blood pressure 128/76, pulse 88, temperature 97.8 °F (36.6 °C), resp. rate 16, height 5' 9" (1.753 m), weight 95.7 kg (211 lb).    Physical Examination:   GEN: no apparent distress, comfortable; awake and answers questions; overweight  HEAD: atraumatic and normocephalic  EYES: no pallor, no icterus, PERRLA  ENT: OMM, no pharyngeal erythema, external ears WNL; no nasal discharge; no thrush  NECK: no masses, thyroid normal, trachea midline, no LAD/LN's, supple  CV: RRR with no murmur; normal pulse; normal S1 and S2; no pedal edema  CHEST: Normal respiratory effort; CTAB; normal breath sounds; no wheeze or crackles  ABDOM: nontender and nondistended; soft; normal bowel sounds; no rebound/guarding  MUSC/Skeletal: ROM normal; no crepitus; " joints normal; no deformities or arthropathy  EXTREM: no clubbing, cyanosis, inflammation or swelling  SKIN: no rashes, lesions, ulcers, petechiae or subcutaneous nodules; chronic but nonpuritic urticaria on arms, legs and trunk  : no laird  NEURO: grossly intact; motor/sensory WNL; moving all 4 extremities; no tremors  PSYCH: normal mood, affect and behavior  LYMPH: normal cervical, supraclavicular, axillary and groin LN's        Labs:     Lab Results   Component Value Date    WBC 4.9 12/22/2022    HGB 15.2 12/22/2022    HCT 44.8 12/22/2022    MCV 91.1 12/22/2022     (L) 12/22/2022     CMP  Sodium   Date Value Ref Range Status   12/22/2022 141 135 - 146 mmol/L Final   09/20/2018 136 134 - 144 mmol/L      Potassium   Date Value Ref Range Status   12/22/2022 4.2 3.5 - 5.3 mmol/L Final     Chloride   Date Value Ref Range Status   12/22/2022 104 98 - 110 mmol/L Final   09/20/2018 103 98 - 110 mmol/L      CO2   Date Value Ref Range Status   12/22/2022 31 20 - 32 mmol/L Final     Glucose   Date Value Ref Range Status   12/22/2022 86 65 - 139 mg/dL Final     Comment:               Non-fasting reference interval        09/20/2018 106 (H) 70 - 99 mg/dL      BUN   Date Value Ref Range Status   12/22/2022 16 7 - 25 mg/dL Final     Creatinine   Date Value Ref Range Status   12/22/2022 1.02 0.60 - 1.26 mg/dL Final   09/20/2018 0.97 0.60 - 1.40 mg/dL      Calcium   Date Value Ref Range Status   12/22/2022 9.0 8.6 - 10.3 mg/dL Final     Total Protein   Date Value Ref Range Status   12/22/2022 6.3 6.1 - 8.1 g/dL Final     Albumin   Date Value Ref Range Status   12/22/2022 4.0 3.6 - 5.1 g/dL Final   09/20/2018 3.9 3.1 - 4.7 g/dL      Total Bilirubin   Date Value Ref Range Status   12/22/2022 0.4 0.2 - 1.2 mg/dL Final     Alkaline Phosphatase   Date Value Ref Range Status   07/09/2020 119 (H) 39 - 117 IU/L Final     AST   Date Value Ref Range Status   12/22/2022 15 10 - 40 U/L Final     ALT   Date Value Ref Range Status    12/22/2022 19 9 - 46 U/L Final     eGFR if    Date Value Ref Range Status   04/06/2022 106 > OR = 60 mL/min/1.73m2 Final     eGFR if non    Date Value Ref Range Status   04/06/2022 91 > OR = 60 mL/min/1.73m2 Final       Platelet Antibody, Direct, Flow Cytometry  Order: [id number]  Status:  Final result   Visible to patient:  Yes (seen) Next appt:  12/01/2021 at 03:15 PM in Rheumatology (Saji Eason MD) Dx:  Thrombocytopenia; Other fatigue   0 Result Notes   Ref Range & Units 1 mo ago   ANTI-IGG NEGATIVE NEGATIVE              Platelet Ab.IgG NEGATIVE NEGATIVE    Platelet Ab.IgM NEGATIVE NEGATIVE      HIV Ag/Ab 4th Gen NON-REACTIVE     Hep A IgM NON-REACTIVE NON-REACTIVE    Comment  For additional information, please refer to http://education.[employer]/faq/FAD158 (This link is being provided for informational/ educational purposes only.)   Hepatitis B Surface Ag NON-REACTIVE NON-REACTIVE    Hep B C IgM NON-REACTIVE NON-REACTIVE    Hepatitis C Ab NON-REACTIVE NON-REACTIVE    Signal/Cutoff <1.00 0.01    Comment:     HCV antibody was non-reactive     EBV Interp.  Suggestive of a recent Dylan-Barr virus infection         Radiology/Diagnostic Studies:    No results found.    I have reviewed all available lab results and radiology reports.    Assessment/Plan:   (1) 37 y.o. male with diagnosis of thrombocytopenia who has been referred by Saji Eason, * for evaluation by medical hematology/oncology.   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; the mild splenomegaly fits in with this diagnosis    12/29/2021:  - plats at 109,000 and  adequate    6/29/2022:  - latest plats at 111,000 and adequate for him  - Hgb and WBC were both normal  - no vomiting episodes since last year  - he did not go see neurology    12/28/2022:  - plats 104,000 and relatively stable  - suspect he has some form of underlying autoimmune mediated process     (2) Autistic disorder and MR     (3) Hypercholesterolemia     (4) Chronic urticaria - followed by Dr Martinez     (5) Cyclic vomiting syndrome - followed by Dr Oliveira  - ? Some sort of gastric or intestinal motility issue due to his underlying autoimmune process     (6) Positive JOSH screen for past 3 yrs          VISIT DIAGNOSES:      Thrombocytopenia          PLAN:  1. continue with supportive care measures   2. CBC every 3 months  3. Avoid NSAIDS, ASA products etc if at all possible  4. F/u with PCP, GI, Rheum     RTC in 6 months  Fax note to Saji Eason,(retired), Roxanne Nix Olivier    Discussion:     COVID-19 Discussion:    I had long discussion with patient and any applicable family about the COVID-19 coronavirus epidemic and the recommended precautions with regard to cancer and/or hematology patients. I have re-iterated the CDC recommendations for adequate hand washing, use of hand -like products, and coughing into elbow, etc. In addition, especially for our patients who are on chemotherapy and/or our otherwise immunocompromised patients, I have recommended avoidance of crowds, including movie theaters, restaurants, churches, etc. I have recommended avoidance of any sick or symptomatic family members and/or friends. I have also recommended avoidance of any raw and unwashed food products, and general avoidance of food items that have not been prepared by themselves. The patient has been asked to call us immediately with any symptom developments, issues, questions or other general concerns.     I spent over 25 mins of time with the patient. Reviewed results of the recently ordered labs,  tests and studies; made directives with regards to the results. Over half of this time was spent couseling and coordinating care.    I have explained all of the above in detail and the patient understands all of the current recommendation(s). I have answered all of their questions to the best of my ability and to their complete satisfaction.   The patient is to continue with the current management plan.            Electronically signed by Aleixs Gomes MD

## 2022-12-28 ENCOUNTER — OFFICE VISIT (OUTPATIENT)
Dept: HEMATOLOGY/ONCOLOGY | Facility: CLINIC | Age: 37
End: 2022-12-28
Payer: MEDICARE

## 2022-12-28 VITALS
HEART RATE: 88 BPM | RESPIRATION RATE: 16 BRPM | WEIGHT: 211 LBS | DIASTOLIC BLOOD PRESSURE: 76 MMHG | HEIGHT: 69 IN | SYSTOLIC BLOOD PRESSURE: 128 MMHG | TEMPERATURE: 98 F | BODY MASS INDEX: 31.25 KG/M2

## 2022-12-28 DIAGNOSIS — D69.6 THROMBOCYTOPENIA: Primary | ICD-10-CM

## 2022-12-28 PROCEDURE — 99213 PR OFFICE/OUTPT VISIT, EST, LEVL III, 20-29 MIN: ICD-10-PCS | Mod: S$GLB,,, | Performed by: INTERNAL MEDICINE

## 2022-12-28 PROCEDURE — 99213 OFFICE O/P EST LOW 20 MIN: CPT | Mod: S$GLB,,, | Performed by: INTERNAL MEDICINE

## 2022-12-28 RX ORDER — PREDNISONE 10 MG/1
TABLET ORAL
COMMUNITY
Start: 2022-09-28 | End: 2023-03-06

## 2023-02-28 DIAGNOSIS — K31.84 GASTROPARESIS: ICD-10-CM

## 2023-02-28 DIAGNOSIS — R11.11 VOMITING WITHOUT NAUSEA: Primary | ICD-10-CM

## 2023-03-06 ENCOUNTER — OFFICE VISIT (OUTPATIENT)
Dept: FAMILY MEDICINE | Facility: CLINIC | Age: 38
End: 2023-03-06
Payer: MEDICARE

## 2023-03-06 VITALS
DIASTOLIC BLOOD PRESSURE: 66 MMHG | SYSTOLIC BLOOD PRESSURE: 100 MMHG | WEIGHT: 213 LBS | BODY MASS INDEX: 31.55 KG/M2 | TEMPERATURE: 98 F | HEART RATE: 70 BPM | OXYGEN SATURATION: 97 % | HEIGHT: 69 IN

## 2023-03-06 DIAGNOSIS — E78.1 PURE HYPERTRIGLYCERIDEMIA: Primary | ICD-10-CM

## 2023-03-06 DIAGNOSIS — R11.15 CYCLIC VOMITING SYNDROME: ICD-10-CM

## 2023-03-06 DIAGNOSIS — D69.6 THROMBOCYTOPENIA: ICD-10-CM

## 2023-03-06 PROCEDURE — 99213 OFFICE O/P EST LOW 20 MIN: CPT | Mod: S$GLB,,, | Performed by: INTERNAL MEDICINE

## 2023-03-06 PROCEDURE — 99213 PR OFFICE/OUTPT VISIT, EST, LEVL III, 20-29 MIN: ICD-10-PCS | Mod: S$GLB,,, | Performed by: INTERNAL MEDICINE

## 2023-03-06 RX ORDER — CETIRIZINE HYDROCHLORIDE 10 MG/1
10 TABLET ORAL DAILY
COMMUNITY

## 2023-03-06 RX ORDER — TALC
6 POWDER (GRAM) TOPICAL NIGHTLY PRN
COMMUNITY

## 2023-03-06 RX ORDER — MINERAL OIL
180 ENEMA (ML) RECTAL DAILY
COMMUNITY

## 2023-03-06 NOTE — PROGRESS NOTES
Subjective:       Patient ID: Wilver Cee Jr. is a 37 y.o. male.    Chief Complaint: Annual Exam (90L form)    Here for routine check up; needs 90L form filled out  He is here with his dad who provides history.  He lives with his dad.  He is going to [facility] for services.  He has a h/o autism, idopathic urticaria, abnormal JOSH, cyclic vomiting followed by GI, and Allergy/Immunology.  There are no planned follow ups with Rheum.  He was previously treated with a fibrate for lipids but that was discontinued when he started to have issues with urticaria.    He has been doing PT for back pain.      Review of Systems   Constitutional:  Negative for activity change, appetite change, chills, diaphoresis, fatigue, fever and unexpected weight change.   HENT:  Positive for postnasal drip. Negative for congestion, ear discharge, ear pain, hearing loss, nosebleeds, rhinorrhea, sinus pressure, sinus pain, sneezing, sore throat, tinnitus, trouble swallowing and voice change.    Eyes:  Negative for photophobia, pain, discharge, redness, itching and visual disturbance.   Respiratory:  Negative for apnea, cough, choking, chest tightness, shortness of breath and wheezing.    Cardiovascular:  Negative for chest pain, palpitations and leg swelling.   Gastrointestinal:  Positive for vomiting. Negative for abdominal distention, abdominal pain, blood in stool, constipation, diarrhea and nausea.   Endocrine: Negative for cold intolerance, heat intolerance, polydipsia and polyuria.   Genitourinary:  Positive for frequency. Negative for decreased urine volume, difficulty urinating, dysuria, enuresis, flank pain, genital sores, hematuria, penile discharge, penile pain, scrotal swelling, testicular pain and urgency.   Musculoskeletal:  Positive for back pain and myalgias. Negative for arthralgias, gait problem, joint swelling, neck pain and neck stiffness.   Skin:  Negative for rash and wound.   Allergic/Immunologic: Negative for  environmental allergies, food allergies and immunocompromised state.   Neurological:  Negative for dizziness, tremors, seizures, syncope, facial asymmetry, speech difficulty, weakness, light-headedness, numbness and headaches.   Hematological:  Negative for adenopathy. Does not bruise/bleed easily.   Psychiatric/Behavioral:  Negative for confusion, decreased concentration, hallucinations, self-injury, sleep disturbance and suicidal ideas. The patient is nervous/anxious.      Past Medical History:   Diagnosis Date    Allergy     Autistic disorder     Cyclic vomiting syndrome 11/11/2020    Hyperlipidemia     Thrombocytopenia 8/19/2021    Urticaria       Past Surgical History:   Procedure Laterality Date    ADENOIDECTOMY      MULTIPLE TOOTH EXTRACTIONS      TYMPANOSTOMY TUBE PLACEMENT         Family History   Problem Relation Age of Onset    Epilepsy Mother     Hypertension Father     Heart attack Father 54    No Known Problems Sister     No Known Problems Brother     No Known Problems Daughter     No Known Problems Son     No Known Problems Maternal Aunt     No Known Problems Maternal Uncle     No Known Problems Paternal Aunt     No Known Problems Paternal Uncle     No Known Problems Maternal Grandmother     No Known Problems Maternal Grandfather     No Known Problems Paternal Grandmother     No Known Problems Paternal Grandfather        Social History     Socioeconomic History    Marital status: Single   Occupational History    Occupation: disabled   Tobacco Use    Smoking status: Never    Smokeless tobacco: Never   Substance and Sexual Activity    Alcohol use: No    Drug use: No    Sexual activity: Never   Social History Narrative    Live with father       Current Outpatient Medications   Medication Sig Dispense Refill    acetaminophen (TYLENOL) 500 MG tablet Take 1 tablet (500 mg total) by mouth every 4 (four) hours as needed for Pain or Temperature greater than (100.4).  0    ascorbic Acid (VITAMIN C) 500 mg CpSR  "Take 500 mg by mouth once daily.      cetirizine (ZYRTEC) 10 MG tablet Take 10 mg by mouth once daily. Rotates with claritin and allegra      fexofenadine (ALLEGRA) 180 MG tablet Take 180 mg by mouth once daily. Rotates with zyrtec and claritin      ibuprofen (ADVIL,MOTRIN) 200 MG tablet Take 2 tablets (400 mg total) by mouth every 6 (six) hours as needed for Pain or Temperature greater than (100.4).  0    loratadine (CLARITIN) 10 mg tablet Take 10 mg by mouth daily as needed for Allergies. Rotates with zyrtec and allegra      melatonin (MELATIN) 3 mg tablet Take 6 mg by mouth nightly as needed for Insomnia.      vitamin D (VITAMIN D3) 1000 units Tab Take 2,000 Units by mouth once daily.       No current facility-administered medications for this visit.       Review of patient's allergies indicates:   Allergen Reactions    Promethazine-dm Other (See Comments)     aggressive behavior, anxiety,  confusion, irritability, dizziness, and diarrhea.     Objective:    [facility]     Annual Exam     Additional comments: 90L form          Last edited by Cris Crockett MA on 3/6/2023  1:13 PM.      Blood pressure 100/66, pulse 70, temperature 98.1 °F (36.7 °C), temperature source Temporal, height 5' 9" (1.753 m), weight 96.6 kg (213 lb), SpO2 97 %. Body mass index is 31.45 kg/m².   Physical Exam  Vitals and nursing note reviewed.   Constitutional:       General: He is not in acute distress.     Appearance: He is well-developed. He is obese. He is not ill-appearing, toxic-appearing or diaphoretic.   HENT:      Head: Normocephalic and atraumatic.   Eyes:      General: No scleral icterus.        Right eye: No discharge.         Left eye: No discharge.      Conjunctiva/sclera: Conjunctivae normal.   Neck:      Vascular: No carotid bruit.   Cardiovascular:      Rate and Rhythm: Normal rate and regular rhythm.      Heart sounds: Normal heart sounds. No murmur heard.  Pulmonary:      Effort: Pulmonary effort is normal. No respiratory " distress.      Breath sounds: Normal breath sounds. No decreased breath sounds, wheezing, rhonchi or rales.   Abdominal:      General: There is no distension.      Palpations: Abdomen is soft.      Tenderness: There is no abdominal tenderness. There is no guarding or rebound.   Musculoskeletal:      Right lower leg: No edema.      Left lower leg: No edema.   Skin:     General: Skin is warm and dry.      Findings: No rash.   Neurological:      Mental Status: He is alert.      Motor: No tremor.   Psychiatric:         Mood and Affect: Mood normal.         Speech: Speech is slurred.         Behavior: Behavior normal.         Cognition and Memory: Cognition is impaired.           Assessment:       1. Pure hypertriglyceridemia    2. Thrombocytopenia    3. Cyclic vomiting syndrome        Plan:       Wilver was seen today for annual exam.    Diagnoses and all orders for this visit:    Pure hypertriglyceridemia  -     Lipid Panel; Future  -     Lipid Panel    Thrombocytopenia  Comments:  Followed by heme/Onc.    Cyclic vomiting syndrome  Comments:  Followed by GI; planning gastric emptying study to evaluate for gastroparesis

## 2023-03-16 ENCOUNTER — HOSPITAL ENCOUNTER (OUTPATIENT)
Dept: RADIOLOGY | Facility: HOSPITAL | Age: 38
Discharge: HOME | End: 2023-03-16
Attending: INTERNAL MEDICINE
Payer: MEDICARE

## 2023-03-16 DIAGNOSIS — R11.11 VOMITING WITHOUT NAUSEA: ICD-10-CM

## 2023-03-16 DIAGNOSIS — K31.84 GASTROPARESIS: ICD-10-CM

## 2023-03-16 PROCEDURE — 78264 GASTRIC EMPTYING IMG STUDY: CPT | Mod: TC

## 2023-05-17 ENCOUNTER — PATIENT MESSAGE (OUTPATIENT)
Dept: ALLERGY | Facility: CLINIC | Age: 38
End: 2023-05-17

## 2023-05-17 DIAGNOSIS — L50.8 CHRONIC URTICARIA: Primary | ICD-10-CM

## 2023-05-23 NOTE — TELEPHONE ENCOUNTER
Chronic urticaria  -     Milk IgE; Future; Expected date: 05/23/2023  -     Bermuda grass IgE; Future; Expected date: 05/23/2023  -     Jameson IgE; Future; Expected date: 05/23/2023  -     [place] grass IgE; Future; Expected date: 05/23/2023  -     Bahia grass IgE; Future; Expected date: 05/23/2023  -     Ragweed, short, common IgE; Future; Expected date: 05/23/2023  -     RAST Allergen Maple ([place]); Future; Expected date: 05/23/2023  -     Allergen-Birch; Future; Expected date: 05/23/2023  -     [place], white IgE; Future; Expected date: 05/23/2023  -     IgE Elm, American; Future; Expected date: 05/23/2023  -     Allergen-Maple [place]; Future; Expected date: 05/23/2023  -     Allergen-Pow[place]; Future; Expected date: 05/23/2023  -     Allergen, White Kapil; Future; Expected date: 05/23/2023  -     Allergen, Pecan Tree IgE; Future; Expected date: 05/23/2023  -     Italian [place] IgE; Future; Expected date: 05/23/2023  -     RAST Allergen [place]; Future; Expected date: 05/23/2023  -     Privet, common IgE; Future; Expected date: 05/23/2023  -     RAST Allergen Sweet Gum; Future; Expected date: 05/23/2023  -     IgE Wormwood; Future; Expected date: 05/23/2023  -     Mugwort IgE; Future; Expected date: 05/23/2023  -     Plantain, English IgE; Future; Expected date: 05/23/2023  -     Thistle, Russian IgE; Future; Expected date: 05/23/2023  -     Allergen-Common Pigweed; Future; Expected date: 05/23/2023  -     Jose elder, rough IgE; Future; Expected date: 05/23/2023  -     RAST Allergen, Sheep [place]); Future; Expected date: 05/23/2023  -     Nettle IgE; Future; Expected date: 05/23/2023  -     IgE Dockweed, Yellow; Future; Expected date: 05/23/2023  -     IgE Fennel, Dog; Future; Expected date: 05/23/2023  -     Cat Dander IgE; Future; Expected date: 05/23/2023  -     Dog dander IgE; Future; Expected date: 05/23/2023  -     Mouse Epithelium IgE; Future; Expected date: 05/23/2023  -      Penicillium IgE; Future; Expected date: 05/23/2023  -     Cladosporium IgE; Future; Expected date: 05/23/2023  -     Aspergillus fumagatus IgE; Future; Expected date: 05/23/2023  -     RAST Allergen Candida albicans; Future; Expected date: 05/23/2023  -     Allergen-Alternaria Alternata; Future; Expected date: 05/23/2023  -     D. pteronyssinus IgE; Future; Expected date: 05/23/2023  -     D. farinae IgE; Future; Expected date: 05/23/2023  -     Allergen-Cockroach, Greek; Future; Expected date: 05/23/2023  -     RAST Allergen for Trichophyton rubrum; Future; Expected date: 05/23/2023  -     Allergen, E.Purpurascens; Future; Expected date: 05/23/2023  -     Curvularia lunata IgE; Future; Expected date: 05/23/2023  -     Shrimp IgE; Future; Expected date: 05/23/2023  -     Crab IgE; Future; Expected date: 05/23/2023  -     Lobster IgE; Future; Expected date: 05/23/2023

## 2023-05-24 ENCOUNTER — TELEPHONE (OUTPATIENT)
Dept: ALLERGY | Facility: CLINIC | Age: 38
End: 2023-05-24

## 2023-05-24 DIAGNOSIS — L50.8 CHRONIC URTICARIA: Primary | ICD-10-CM

## 2023-05-24 DIAGNOSIS — L27.2 DERMATITIS DUE TO INGESTED FOOD: ICD-10-CM

## 2023-05-24 DIAGNOSIS — L50.9 URTICARIA: ICD-10-CM

## 2023-05-25 ENCOUNTER — PATIENT MESSAGE (OUTPATIENT)
Dept: ALLERGY | Facility: CLINIC | Age: 38
End: 2023-05-25

## 2023-05-31 ENCOUNTER — PATIENT MESSAGE (OUTPATIENT)
Dept: ALLERGY | Facility: CLINIC | Age: 38
End: 2023-05-31

## 2023-06-05 LAB
A FUMIGATUS IGE QN: <0.1 KU/L
BAHIA GRASS IGE QN: <0.1 KU/L
BERMUDA GRASS IGE QN: <0.1 KU/L
BOXELDER IGE QN: 0.13 KU/L
C ALBICANS IGE QN: <0.1 KU/L
C HERBARUM IGE QN: <0.1 KU/L
CAT DANDER IGE QN: <0.1 KU/L
CMN PIGWEED IGE QN: <0.1 KU/L
COMMON RAGWEED IGE QN: <0.1 KU/L
COTTONWOOD IGE QN: <0.1 KU/L
DEPRECATED A FUMIGATUS IGE RAST QL: 0
DEPRECATED BAHIA GRASS IGE RAST QL: 0
DEPRECATED BERMUDA GRASS IGE RAST QL: 0
DEPRECATED BOXELDER IGE RAST QL: ABNORMAL
DEPRECATED C ALBICANS IGE RAST QL: 0
DEPRECATED C HERBARUM IGE RAST QL: 0
DEPRECATED CAT DANDER IGE RAST QL: 0
DEPRECATED COMMON PIGWEED IGE RAST QL: 0
DEPRECATED COMMON RAGWEED IGE RAST QL: 0
DEPRECATED COTTONWOOD IGE RAST QL: 0
DEPRECATED DOG DANDER IGE RAST QL: 0
DEPRECATED DOG FENNEL IGE RAST QL: 0
DEPRECATED ENGL PLANTAIN IGE RAST QL: 0
DEPRECATED ITALIAN CYPRESS IGE RAST QL: 0
DEPRECATED JOHNSON GRASS IGE RAST QL: 0
DEPRECATED LONDON PLANE IGE RAST QL: 0
DEPRECATED MARSH ELDER IGE RAST QL: 0
DEPRECATED MILK IGE RAST QL: 0
DEPRECATED MOUSE EPITH IGE RAST QL: 0
DEPRECATED MUGWORT IGE RAST QL: 0
DEPRECATED NETTLE IGE RAST QL: 0
DEPRECATED P NOTATUM IGE RAST QL: 0
DEPRECATED PECAN/HICK TREE IGE RAST QL: 0
DEPRECATED PRIVET IGE RAST QL: 0
DEPRECATED SALTWORT IGE RAST QL: 0
DEPRECATED SHEEP SORREL IGE RAST QL: 0
DEPRECATED SILVER BIRCH IGE RAST QL: 0
DEPRECATED SWEET GUM IGE RAST QL: 0
DEPRECATED TIMOTHY IGE RAST QL: 0
DEPRECATED WHITE ASH IGE RAST QL: 0
DEPRECATED WHITE ELM IGE RAST QL: 0
DEPRECATED WHITE MULBERRY IGE RAST QL: 0
DEPRECATED WHITE OAK IGE RAST QL: 0
DEPRECATED WORMWOOD IGE RAST QL: 0
DEPRECATED YELLOW DOCK IGE RAST QL: 0 CLASS
DOG DANDER IGE QN: <0.1 KU/L
DOG FENNEL IGE QN: <0.1 KU/L
ENGL PLANTAIN IGE QN: <0.1 KU/L
ITALIAN CYPRESS IGE QN: <0.1 KU/L
JOHNSON GRASS IGE QN: <0.1 KU/L
LONDON PLANE IGE QN: <0.1 KU/L
MARSH ELDER IGE QN: <0.1 KU/L
MILK IGE QN: <0.1 KU/L
MOUSE EPITH IGE QN: <0.1 KU/L
MUGWORT IGE QN: <0.1 KU/L
NETTLE IGE QN: <0.1 KU/L
P NOTATUM IGE QN: <0.1 KU/L
PECAN/HICK TREE IGE QN: <0.1 KU/L
PRIVET IGE QN: <0.1 KU/L
REF LAB TEST REF RANGE: NORMAL
SALTWORT IGE QN: <0.1 KU/L
SHEEP SORREL IGE QN: <0.1 KU/L
SILVER BIRCH IGE QN: <0.1 KU/L
SWEET GUM IGE QN: <0.1 KU/L
TIMOTHY IGE QN: <0.1 KU/L
WHITE ASH IGE QN: <0.1 KU/L
WHITE ELM IGE QN: <0.1 KU/L
WHITE MULBERRY IGE QN: <0.1 KU/L
WHITE OAK IGE QN: <0.1 KU/L
WORMWOOD IGE QN: <0.1 KU/L
YELLOW DOCK IGE QN: <0.1 KU/L

## 2023-06-07 LAB
DEPRECATED TROUT IGE RAST QL: 0
DEPRECATED TUNA IGE RAST QL: 0
REF LAB TEST REF RANGE: NORMAL
TROUT IGE QN: <0.1 KU/L
TUNA IGE QN: <0.1 KU/L

## 2023-06-08 ENCOUNTER — PATIENT MESSAGE (OUTPATIENT)
Dept: ALLERGY | Facility: CLINIC | Age: 38
End: 2023-06-08

## 2023-06-20 LAB
CHOLEST SERPL-MCNC: 151 MG/DL
CHOLEST/HDLC SERPL: 4.4 (CALC)
HDLC SERPL-MCNC: 34 MG/DL
LDLC SERPL CALC-MCNC: 96 MG/DL (CALC)
NONHDLC SERPL-MCNC: 117 MG/DL (CALC)
TRIGL SERPL-MCNC: 111 MG/DL

## 2023-06-21 ENCOUNTER — PATIENT MESSAGE (OUTPATIENT)
Dept: ALLERGY | Facility: CLINIC | Age: 38
End: 2023-06-21

## 2023-06-27 NOTE — PROGRESS NOTES
"[facility] Hematology/Oncology  PROGRESS NOTE -  Follow-up Visit      Subjective:       Patient ID:   NAME: Wilver Cee Jr. : 1985     37 y.o. male    Referring Doc: Saji Eason, *  Other Physicians: Greg Smith; Juan; Roxanne; Jimenez Solis  (ENT)           Chief Complaint: tcp f/u       History of Present Illness:     Patient returns today for a regularly scheduled follow-up visit.  The patient is here today to go over the results of the recently ordered labs, tests and studies. He is here with his dad.       He is feeling ok. He reports that he is "good". No excessive bleeding and bruising. No CP, SOB, HA's or N/V.    He had some difficulty with [facility] with regard to Dr Martinez's blood-work    He has chronic hives issues since about 2022 and sees Dr Laureano and Dr Martinez     He saw Dr Nix on 3/6/2023    He saw Dr Bee with ortho for his lower back issues with DDD    Discussed covid precautions and he has had his vaccinations            ROS:   GEN: normal without any fever, night sweats or weight loss; chronic hives and LBP  HEENT: normal with no HA's, sore throat, stiff neck, changes in vision  CV: normal with no CP, SOB, PND, CRUZ or orthopnea  PULM: normal with no SOB, cough, hemoptysis, sputum or pleuritic pain  GI: normal with no abdominal pain, nausea, vomiting, constipation, diarrhea, melanotic stools, BRBPR, or hematemesis  : normal with no hematuria, dysuria  BREAST: normal with no mass, discharge, pain  SKIN: normal with no rash, erythema, bruising, or swelling    Pain Scale: 0    Allergies:  Review of patient's allergies indicates:   Allergen Reactions    Promethazine-dm Other (See Comments)     aggressive behavior, anxiety,  confusion, irritability, dizziness, and diarrhea.       Medications:    Current Outpatient Medications:     acetaminophen (TYLENOL) 500 MG tablet, Take 1 tablet (500 mg total) by mouth every 4 (four) hours as needed for Pain or Temperature " "greater than (100.4)., Disp: , Rfl: 0    amitriptyline (ELAVIL) 25 MG tablet, Take 25 mg by mouth every evening. 3 more dose left at 12.5 mg then stopping per pt dad, Disp: , Rfl:     ascorbic Acid (VITAMIN C) 500 mg CpSR, Take 500 mg by mouth once daily., Disp: , Rfl:     cetirizine (ZYRTEC) 10 MG tablet, Take 10 mg by mouth once daily. Rotates with claritin and allegra, Disp: , Rfl:     fexofenadine (ALLEGRA) 180 MG tablet, Take 180 mg by mouth once daily. Rotates with zyrtec and claritin, Disp: , Rfl:     ibuprofen (ADVIL,MOTRIN) 200 MG tablet, Take 2 tablets (400 mg total) by mouth every 6 (six) hours as needed for Pain or Temperature greater than (100.4)., Disp: , Rfl: 0    loratadine (CLARITIN) 10 mg tablet, Take 10 mg by mouth daily as needed for Allergies. Rotates with zyrtec and allegra, Disp: , Rfl:     melatonin (MELATIN) 3 mg tablet, Take 6 mg by mouth nightly as needed for Insomnia., Disp: , Rfl:     vitamin D (VITAMIN D3) 1000 units Tab, Take 2,000 Units by mouth once daily., Disp: , Rfl:     PMHx/PSHx Updates:  See patient's last visit with me on 12/28/2022  See H&P on 8/20/2021        Pathology:   Cancer Staging   No matching staging information was found for the patient.          Objective:     Vitals:  Blood pressure 138/85, pulse 98, temperature 97.7 °F (36.5 °C), resp. rate 18, height 5' 9" (1.753 m), weight 93.9 kg (207 lb).    Physical Examination:   GEN: no apparent distress, comfortable; awake and answers questions; overweight  HEAD: atraumatic and normocephalic  EYES: no pallor, no icterus, PERRLA  ENT: OMM, no pharyngeal erythema, external ears WNL; no nasal discharge; no thrush  NECK: no masses, thyroid normal, trachea midline, no LAD/LN's, supple  CV: RRR with no murmur; normal pulse; normal S1 and S2; no pedal edema  CHEST: Normal respiratory effort; CTAB; normal breath sounds; no wheeze or crackles  ABDOM: nontender and nondistended; soft; normal bowel sounds; no " rebound/guarding  MUSC/Skeletal: ROM normal; no crepitus; joints normal; no deformities or arthropathy  EXTREM: no clubbing, cyanosis, inflammation or swelling  SKIN: no rashes, lesions, ulcers, petechiae or subcutaneous nodules; chronic but nonpuritic urticaria on arms, legs and trunk  : no laird  NEURO: grossly intact; motor/sensory WNL; moving all 4 extremities; no tremors  PSYCH: normal mood, affect and behavior  LYMPH: normal cervical, supraclavicular, axillary and groin LN's        Labs:     Lab Results   Component Value Date    WBC 5.7 06/19/2023    HGB 17.5 (H) 06/19/2023    HCT 48.9 06/19/2023    MCV 90.1 06/19/2023    PLT 95 (L) 06/19/2023     CMP  Sodium   Date Value Ref Range Status   06/19/2023 141 135 - 146 mmol/L Final   09/20/2018 136 134 - 144 mmol/L      Potassium   Date Value Ref Range Status   06/19/2023 4.3 3.5 - 5.3 mmol/L Final     Chloride   Date Value Ref Range Status   06/19/2023 105 98 - 110 mmol/L Final   09/20/2018 103 98 - 110 mmol/L      CO2   Date Value Ref Range Status   06/19/2023 27 20 - 32 mmol/L Final     Glucose   Date Value Ref Range Status   06/19/2023 88 65 - 99 mg/dL Final     Comment:                   Fasting reference interval        09/20/2018 106 (H) 70 - 99 mg/dL      BUN   Date Value Ref Range Status   06/19/2023 16 7 - 25 mg/dL Final     Creatinine   Date Value Ref Range Status   06/19/2023 0.83 0.60 - 1.26 mg/dL Final   09/20/2018 0.97 0.60 - 1.40 mg/dL      Calcium   Date Value Ref Range Status   06/19/2023 9.4 8.6 - 10.3 mg/dL Final     Total Protein   Date Value Ref Range Status   06/19/2023 7.0 6.1 - 8.1 g/dL Final     Albumin   Date Value Ref Range Status   06/19/2023 4.3 3.6 - 5.1 g/dL Final   09/20/2018 3.9 3.1 - 4.7 g/dL      Total Bilirubin   Date Value Ref Range Status   06/19/2023 0.5 0.2 - 1.2 mg/dL Final     Alkaline Phosphatase   Date Value Ref Range Status   07/09/2020 119 (H) 39 - 117 IU/L Final     AST   Date Value Ref Range Status   06/19/2023 13  10 - 40 U/L Final     ALT   Date Value Ref Range Status   06/19/2023 16 9 - 46 U/L Final     eGFR if    Date Value Ref Range Status   04/06/2022 106 > OR = 60 mL/min/1.73m2 Final     eGFR if non    Date Value Ref Range Status   04/06/2022 91 > OR = 60 mL/min/1.73m2 Final       Platelet Antibody, Direct, Flow Cytometry  Order: [id number]  Status:  Final result   Visible to patient:  Yes (seen) Next appt:  12/01/2021 at 03:15 PM in Rheumatology (Saji Eason MD) Dx:  Thrombocytopenia; Other fatigue   0 Result Notes   Ref Range & Units 1 mo ago   ANTI-IGG NEGATIVE NEGATIVE              Platelet Ab.IgG NEGATIVE NEGATIVE    Platelet Ab.IgM NEGATIVE NEGATIVE      HIV Ag/Ab 4th Gen NON-REACTIVE     Hep A IgM NON-REACTIVE NON-REACTIVE    Comment  For additional information, please refer to http://education.[employer].com.Groupalia/faq/XRL610 (This link is being provided for informational/ educational purposes only.)   Hepatitis B Surface Ag NON-REACTIVE NON-REACTIVE    Hep B C IgM NON-REACTIVE NON-REACTIVE    Hepatitis C Ab NON-REACTIVE NON-REACTIVE    Signal/Cutoff <1.00 0.01    Comment:     HCV antibody was non-reactive     EBV Interp.  Suggestive of a recent Dylan-Barr virus infection         Radiology/Diagnostic Studies:    No results found.    I have reviewed all available lab results and radiology reports.    Assessment/Plan:   (1) 37 y.o. male with diagnosis of thrombocytopenia who has been referred by Saji Eason, * for evaluation by medical hematology/oncology.   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; the mild splenomegaly fits in with this  diagnosis    12/29/2021:  - plats at 109,000 and adequate    6/29/2022:  - latest plats at 111,000 and adequate for him  - Hgb and WBC were both normal  - no vomiting episodes since last year  - he did not go see neurology    12/28/2022:  - plats 104,000 and relatively stable  - suspect he has some form of underlying autoimmune mediated process    6/28/2023:  - latest plats at 95,000 and relatively stable     (2) Autistic disorder and MR     (3) Hypercholesterolemia     (4) Chronic urticaria - followed by Dr Martinez     (5) Cyclic vomiting syndrome - followed by Dr Oliveira  - ? Some sort of gastric or intestinal motility issue due to his underlying autoimmune process    6/28/2023:  - recent trial of amitriptyline per Dr Oliveira's direction  - recent vomiting episode about 2 weeks ago     (6) Positive JOSH screen     (7) Anxiety          VISIT DIAGNOSES:      Thrombocytopenia          PLAN:  1. continue with supportive care measures and observation  2. CBC every 3 months  3. Avoid NSAIDS, ASA products etc if at all possible  4. F/u with PCP, GI, Rheum     RTC in 6 months  Fax note to Saji Eason(retired), Roxanne Nix Olivier    Discussion:     COVID-19 Discussion:    I had long discussion with patient and any applicable family about the COVID-19 coronavirus epidemic and the recommended precautions with regard to cancer and/or hematology patients. I have re-iterated the CDC recommendations for adequate hand washing, use of hand -like products, and coughing into elbow, etc. In addition, especially for our patients who are on chemotherapy and/or our otherwise immunocompromised patients, I have recommended avoidance of crowds, including movie theaters, restaurants, churches, etc. I have recommended avoidance of any sick or symptomatic family members and/or friends. I have also recommended avoidance of any raw and unwashed food products, and general avoidance of food items that have not been  prepared by themselves. The patient has been asked to call us immediately with any symptom developments, issues, questions or other general concerns.     I spent over 25 mins of time with the patient. Reviewed results of the recently ordered labs, tests and studies; made directives with regards to the results. Over half of this time was spent couseling and coordinating care.    I have explained all of the above in detail and the patient understands all of the current recommendation(s). I have answered all of their questions to the best of my ability and to their complete satisfaction.   The patient is to continue with the current management plan.            Electronically signed by Alexis Gomes MD

## 2023-06-28 ENCOUNTER — OFFICE VISIT (OUTPATIENT)
Dept: HEMATOLOGY/ONCOLOGY | Facility: CLINIC | Age: 38
End: 2023-06-28
Payer: MEDICARE

## 2023-06-28 VITALS
RESPIRATION RATE: 18 BRPM | SYSTOLIC BLOOD PRESSURE: 138 MMHG | TEMPERATURE: 98 F | BODY MASS INDEX: 30.66 KG/M2 | HEIGHT: 69 IN | DIASTOLIC BLOOD PRESSURE: 85 MMHG | HEART RATE: 98 BPM | WEIGHT: 207 LBS

## 2023-06-28 DIAGNOSIS — D69.6 THROMBOCYTOPENIA: Primary | ICD-10-CM

## 2023-06-28 PROCEDURE — 99213 PR OFFICE/OUTPT VISIT, EST, LEVL III, 20-29 MIN: ICD-10-PCS | Mod: S$GLB,,, | Performed by: INTERNAL MEDICINE

## 2023-06-28 PROCEDURE — 99213 OFFICE O/P EST LOW 20 MIN: CPT | Mod: S$GLB,,, | Performed by: INTERNAL MEDICINE

## 2023-06-28 RX ORDER — AMITRIPTYLINE HYDROCHLORIDE 25 MG/1
25 TABLET, FILM COATED ORAL NIGHTLY
COMMUNITY
Start: 2023-06-13 | End: 2024-03-04

## 2023-10-11 ENCOUNTER — PATIENT MESSAGE (OUTPATIENT)
Dept: FAMILY MEDICINE | Facility: CLINIC | Age: 38
End: 2023-10-11

## 2023-12-13 ENCOUNTER — LAB VISIT (OUTPATIENT)
Dept: LAB | Facility: HOSPITAL | Age: 38
End: 2023-12-13
Attending: INTERNAL MEDICINE
Payer: MEDICARE

## 2023-12-13 DIAGNOSIS — D69.6 THROMBOCYTOPENIA: ICD-10-CM

## 2023-12-13 LAB
ALBUMIN SERPL BCP-MCNC: 3.8 G/DL (ref 3.5–5.2)
ALP SERPL-CCNC: 91 U/L (ref 55–135)
ALT SERPL W/O P-5'-P-CCNC: 12 U/L (ref 10–44)
ANION GAP SERPL CALC-SCNC: 3 MMOL/L (ref 8–16)
AST SERPL-CCNC: 13 U/L (ref 10–40)
BASOPHILS # BLD AUTO: 0.01 K/UL (ref 0–0.2)
BASOPHILS NFR BLD: 0.1 % (ref 0–1.9)
BILIRUB SERPL-MCNC: 0.6 MG/DL (ref 0.1–1)
BUN SERPL-MCNC: 13 MG/DL (ref 6–20)
CALCIUM SERPL-MCNC: 8.8 MG/DL (ref 8.7–10.5)
CHLORIDE SERPL-SCNC: 108 MMOL/L (ref 95–110)
CO2 SERPL-SCNC: 26 MMOL/L (ref 23–29)
CREAT SERPL-MCNC: 0.9 MG/DL (ref 0.5–1.4)
DIFFERENTIAL METHOD: ABNORMAL
EOSINOPHIL # BLD AUTO: 0 K/UL (ref 0–0.5)
EOSINOPHIL NFR BLD: 0.3 % (ref 0–8)
ERYTHROCYTE [DISTWIDTH] IN BLOOD BY AUTOMATED COUNT: 12.6 % (ref 11.5–14.5)
EST. GFR  (NO RACE VARIABLE): >60 ML/MIN/1.73 M^2
GLUCOSE SERPL-MCNC: 133 MG/DL (ref 70–110)
HCT VFR BLD AUTO: 42.8 % (ref 40–54)
HGB BLD-MCNC: 14.5 G/DL (ref 14–18)
IMM GRANULOCYTES # BLD AUTO: 0.03 K/UL (ref 0–0.04)
IMM GRANULOCYTES NFR BLD AUTO: 0.4 % (ref 0–0.5)
LYMPHOCYTES # BLD AUTO: 1 K/UL (ref 1–4.8)
LYMPHOCYTES NFR BLD: 13.2 % (ref 18–48)
MCH RBC QN AUTO: 30.3 PG (ref 27–31)
MCHC RBC AUTO-ENTMCNC: 33.9 G/DL (ref 32–36)
MCV RBC AUTO: 89 FL (ref 82–98)
MONOCYTES # BLD AUTO: 0.2 K/UL (ref 0.3–1)
MONOCYTES NFR BLD: 2.3 % (ref 4–15)
NEUTROPHILS # BLD AUTO: 6.5 K/UL (ref 1.8–7.7)
NEUTROPHILS NFR BLD: 83.7 % (ref 38–73)
NRBC BLD-RTO: 0 /100 WBC
PLATELET # BLD AUTO: 97 K/UL (ref 150–450)
PMV BLD AUTO: 10.9 FL (ref 9.2–12.9)
POTASSIUM SERPL-SCNC: 3.7 MMOL/L (ref 3.5–5.1)
PROT SERPL-MCNC: 6.1 G/DL (ref 6–8.4)
RBC # BLD AUTO: 4.79 M/UL (ref 4.6–6.2)
SODIUM SERPL-SCNC: 137 MMOL/L (ref 136–145)
WBC # BLD AUTO: 7.81 K/UL (ref 3.9–12.7)

## 2023-12-13 PROCEDURE — 80053 COMPREHEN METABOLIC PANEL: CPT | Performed by: INTERNAL MEDICINE

## 2023-12-13 PROCEDURE — 85025 COMPLETE CBC W/AUTO DIFF WBC: CPT | Performed by: INTERNAL MEDICINE

## 2023-12-13 PROCEDURE — 36415 COLL VENOUS BLD VENIPUNCTURE: CPT | Performed by: INTERNAL MEDICINE

## 2023-12-17 ENCOUNTER — PATIENT MESSAGE (OUTPATIENT)
Dept: FAMILY MEDICINE | Facility: CLINIC | Age: 38
End: 2023-12-17
Payer: MEDICARE

## 2023-12-18 RX ORDER — ONDANSETRON 8 MG/1
8 TABLET, ORALLY DISINTEGRATING ORAL 3 TIMES DAILY PRN
Qty: 15 TABLET | Refills: 3 | Status: SHIPPED | OUTPATIENT
Start: 2023-12-18

## 2023-12-27 ENCOUNTER — TELEPHONE (OUTPATIENT)
Dept: HEMATOLOGY/ONCOLOGY | Facility: CLINIC | Age: 38
End: 2023-12-27

## 2023-12-27 NOTE — TELEPHONE ENCOUNTER
Dr Gomes reviewed patient's labs and per his verbal order, patient's father, Wilver Toussaint, made aware that lab work was good. Father verbalized understanding of above.

## 2024-01-30 NOTE — PROGRESS NOTES
"[facility] Hematology/Oncology  PROGRESS NOTE -  Follow-up Visit      Subjective:       Patient ID:   NAME: Wilver Cee Jr. : 1985     38 y.o. male    Referring Doc: Saji Eason, *  Other Physicians: Greg Smith; Juan; Roxanne; Jimenez Solis  (ENT)           Chief Complaint: tcp f/u       History of Present Illness:     Patient returns today for a regularly scheduled follow-up visit.  The patient is here today to go over the results of the recently ordered labs, tests and studies. He is here with his dad.       He is feeling ok. He reports that he is "good". No excessive bleeding and bruising. No CP, SOB, HA's or N/V. He had recent cold which has since resolved. He had some hives this am.     He has chronic hives issues since about 2022 and sees Dr aLureano and Dr Martinez    He last saw Dr Nix on 3/6/2023      Discussed covid precautions and he has had his vaccinations            ROS:   GEN: normal without any fever, night sweats or weight loss; chronic hives and LBP  HEENT: normal with no HA's, sore throat, stiff neck, changes in vision  CV: normal with no CP, SOB, PND, CRUZ or orthopnea  PULM: normal with no SOB, cough, hemoptysis, sputum or pleuritic pain  GI: normal with no abdominal pain, nausea, vomiting, constipation, diarrhea, melanotic stools, BRBPR, or hematemesis  : normal with no hematuria, dysuria  BREAST: normal with no mass, discharge, pain  SKIN: normal with no rash, erythema, bruising, or swelling    Pain Scale: 0    Allergies:  Review of patient's allergies indicates:   Allergen Reactions    Promethazine-dm Other (See Comments)     aggressive behavior, anxiety,  confusion, irritability, dizziness, and diarrhea.       Medications:    Current Outpatient Medications:     acetaminophen (TYLENOL) 500 MG tablet, Take 1 tablet (500 mg total) by mouth every 4 (four) hours as needed for Pain or Temperature greater than (100.4)., Disp: , Rfl: 0    amitriptyline (ELAVIL) 25 MG " tablet, Take 25 mg by mouth every evening. 3 more dose left at 12.5 mg then stopping per pt dad, Disp: , Rfl:     ascorbic Acid (VITAMIN C) 500 mg CpSR, Take 500 mg by mouth once daily., Disp: , Rfl:     cetirizine (ZYRTEC) 10 MG tablet, Take 10 mg by mouth once daily. Rotates with claritin and allegra, Disp: , Rfl:     fexofenadine (ALLEGRA) 180 MG tablet, Take 180 mg by mouth once daily. Rotates with zyrtec and claritin, Disp: , Rfl:     ibuprofen (ADVIL,MOTRIN) 200 MG tablet, Take 2 tablets (400 mg total) by mouth every 6 (six) hours as needed for Pain or Temperature greater than (100.4)., Disp: , Rfl: 0    loratadine (CLARITIN) 10 mg tablet, Take 10 mg by mouth daily as needed for Allergies. Rotates with zyrtec and allegra, Disp: , Rfl:     melatonin (MELATIN) 3 mg tablet, Take 6 mg by mouth nightly as needed for Insomnia., Disp: , Rfl:     ondansetron (ZOFRAN-ODT) 8 MG TbDL, Take 1 tablet (8 mg total) by mouth 3 (three) times daily as needed (vomiting)., Disp: 15 tablet, Rfl: 3    vitamin D (VITAMIN D3) 1000 units Tab, Take 2,000 Units by mouth once daily., Disp: , Rfl:     PMHx/PSHx Updates:  See patient's last visit with me on 12/28/2022  See H&P on 8/20/2021        Pathology:   Cancer Staging   No matching staging information was found for the patient.          Objective:     Vitals:  Blood pressure 107/71, pulse 82, temperature 98.7 °F (37.1 °C), resp. rate 16, weight 95.3 kg (210 lb 3.2 oz).    Physical Examination:   GEN: no apparent distress, comfortable; awake and answers questions; overweight  HEAD: atraumatic and normocephalic  EYES: no pallor, no icterus, PERRLA  ENT: OMM, no pharyngeal erythema, external ears WNL; no nasal discharge; no thrush  NECK: no masses, thyroid normal, trachea midline, no LAD/LN's, supple  CV: RRR with no murmur; normal pulse; normal S1 and S2; no pedal edema  CHEST: Normal respiratory effort; CTAB; normal breath sounds; no wheeze or crackles  ABDOM: nontender and  nondistended; soft; normal bowel sounds; no rebound/guarding  MUSC/Skeletal: ROM normal; no crepitus; joints normal; no deformities or arthropathy  EXTREM: no clubbing, cyanosis, inflammation or swelling  SKIN: no rashes, lesions, ulcers, petechiae or subcutaneous nodules; chronic but nonpuritic urticaria on arms, legs and trunk  : no laird  NEURO: grossly intact; motor/sensory WNL; moving all 4 extremities; no tremors  PSYCH: normal mood, affect and behavior  LYMPH: normal cervical, supraclavicular, axillary and groin LN's        Labs:     Lab Results   Component Value Date    WBC 7.81 12/13/2023    HGB 14.5 12/13/2023    HCT 42.8 12/13/2023    MCV 89 12/13/2023    PLT 97 (L) 12/13/2023     CMP  Sodium   Date Value Ref Range Status   12/13/2023 137 136 - 145 mmol/L Final   09/20/2018 136 134 - 144 mmol/L      Potassium   Date Value Ref Range Status   12/13/2023 3.7 3.5 - 5.1 mmol/L Final     Chloride   Date Value Ref Range Status   12/13/2023 108 95 - 110 mmol/L Final   09/20/2018 103 98 - 110 mmol/L      CO2   Date Value Ref Range Status   12/13/2023 26 23 - 29 mmol/L Final     Glucose   Date Value Ref Range Status   12/13/2023 133 (H) 70 - 110 mg/dL Final   09/20/2018 106 (H) 70 - 99 mg/dL      BUN   Date Value Ref Range Status   12/13/2023 13 6 - 20 mg/dL Final     Creatinine   Date Value Ref Range Status   12/13/2023 0.9 0.5 - 1.4 mg/dL Final   09/20/2018 0.97 0.60 - 1.40 mg/dL      Calcium   Date Value Ref Range Status   12/13/2023 8.8 8.7 - 10.5 mg/dL Final     Total Protein   Date Value Ref Range Status   12/13/2023 6.1 6.0 - 8.4 g/dL Final     Albumin   Date Value Ref Range Status   12/13/2023 3.8 3.5 - 5.2 g/dL Final   09/20/2018 3.9 3.1 - 4.7 g/dL      Total Bilirubin   Date Value Ref Range Status   12/13/2023 0.6 0.1 - 1.0 mg/dL Final     Comment:     For infants and newborns, interpretation of results should be based  on gestational age, weight and in agreement with  clinical  observations.    Premature Infant recommended reference ranges:  Up to 24 hours.............<8.0 mg/dL  Up to 48 hours............<12.0 mg/dL  3-5 days..................<15.0 mg/dL  6-29 days.................<15.0 mg/dL       Alkaline Phosphatase   Date Value Ref Range Status   12/13/2023 91 55 - 135 U/L Final     AST   Date Value Ref Range Status   12/13/2023 13 10 - 40 U/L Final     ALT   Date Value Ref Range Status   12/13/2023 12 10 - 44 U/L Final     Anion Gap   Date Value Ref Range Status   12/13/2023 3 (L) 8 - 16 mmol/L Final     eGFR if    Date Value Ref Range Status   04/06/2022 106 > OR = 60 mL/min/1.73m2 Final     eGFR if non    Date Value Ref Range Status   04/06/2022 91 > OR = 60 mL/min/1.73m2 Final       Platelet Antibody, Direct, Flow Cytometry  Order: [id number]  Status:  Final result   Visible to patient:  Yes (seen) Next appt:  12/01/2021 at 03:15 PM in Rheumatology (Saji Eason MD) Dx:  Thrombocytopenia; Other fatigue   0 Result Notes   Ref Range & Units 1 mo ago   ANTI-IGG NEGATIVE NEGATIVE              Platelet Ab.IgG NEGATIVE NEGATIVE    Platelet Ab.IgM NEGATIVE NEGATIVE      HIV Ag/Ab 4th Gen NON-REACTIVE     Hep A IgM NON-REACTIVE NON-REACTIVE    Comment  For additional information, please refer to http://education.[employer].StyleFactory/faq/YOY733 (This link is being provided for informational/ educational purposes only.)   Hepatitis B Surface Ag NON-REACTIVE NON-REACTIVE    Hep B C IgM NON-REACTIVE NON-REACTIVE    Hepatitis C Ab NON-REACTIVE NON-REACTIVE    Signal/Cutoff <1.00 0.01    Comment:     HCV antibody was non-reactive     EBV Interp.  Suggestive of a recent Dylan-Barr virus infection         Radiology/Diagnostic Studies:    No results found.    I have reviewed all available lab results and radiology reports.    Assessment/Plan:   (1) 38 y.o. male with diagnosis of thrombocytopenia who has been referred by Saji Eason, *  for evaluation by medical hematology/oncology.   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; the mild splenomegaly fits in with this diagnosis    12/29/2021:  - plats at 109,000 and adequate    6/29/2022:  - latest plats at 111,000 and adequate for him  - Hgb and WBC were both normal  - no vomiting episodes since last year  - he did not go see neurology    12/28/2022:  - plats 104,000 and relatively stable  - suspect he has some form of underlying autoimmune mediated process    6/28/2023:  - latest plats at 95,000 and relatively stable    1/31/2024:  - no anemia; WBC adequate  - platelets at 97,000 and relatively stable     (2) Autistic disorder and MR     (3) Hypercholesterolemia     (4) Chronic urticaria - followed by Dr Martinez     (5) Cyclic vomiting syndrome - followed by Dr Oliveira  - ? Some sort of gastric or intestinal motility issue due to his underlying autoimmune process    6/28/2023:  - recent trial of amitriptyline per Dr Oliveira's direction  - recent vomiting episode about 2 weeks ago     (6) Positive JOSH screen     (7) Anxiety          VISIT DIAGNOSES:      Thrombocytopenia          PLAN:  1. continue with supportive care measures and observation  2. CBC every 3 months  3. Avoid NSAIDS, ASA products etc if at all possible  4. F/u with PCP, GI, Rheum     RTC in 6 months  Fax note to Saji Eason(retired), Roxanne Nix Olivier    Discussion:     COVID-19 Discussion:    I had long discussion with patient and any applicable family about the COVID-19 coronavirus epidemic and the recommended precautions with regard to cancer and/or hematology patients. I have  re-iterated the CDC recommendations for adequate hand washing, use of hand -like products, and coughing into elbow, etc. In addition, especially for our patients who are on chemotherapy and/or our otherwise immunocompromised patients, I have recommended avoidance of crowds, including movie theaters, restaurants, churches, etc. I have recommended avoidance of any sick or symptomatic family members and/or friends. I have also recommended avoidance of any raw and unwashed food products, and general avoidance of food items that have not been prepared by themselves. The patient has been asked to call us immediately with any symptom developments, issues, questions or other general concerns.     I spent over 25 mins of time with the patient. Reviewed results of the recently ordered labs, tests and studies; made directives with regards to the results. Over half of this time was spent couseling and coordinating care.    I have explained all of the above in detail and the patient understands all of the current recommendation(s). I have answered all of their questions to the best of my ability and to their complete satisfaction.   The patient is to continue with the current management plan.            Electronically signed by Alexis Gomes MD

## 2024-01-31 ENCOUNTER — OFFICE VISIT (OUTPATIENT)
Dept: HEMATOLOGY/ONCOLOGY | Facility: CLINIC | Age: 39
End: 2024-01-31
Payer: MEDICARE

## 2024-01-31 VITALS
DIASTOLIC BLOOD PRESSURE: 71 MMHG | BODY MASS INDEX: 31.04 KG/M2 | RESPIRATION RATE: 16 BRPM | WEIGHT: 210.19 LBS | TEMPERATURE: 99 F | SYSTOLIC BLOOD PRESSURE: 107 MMHG | HEART RATE: 82 BPM

## 2024-01-31 DIAGNOSIS — R73.09 OTHER ABNORMAL GLUCOSE: ICD-10-CM

## 2024-01-31 DIAGNOSIS — D69.6 THROMBOCYTOPENIA: Primary | ICD-10-CM

## 2024-01-31 PROCEDURE — 99213 OFFICE O/P EST LOW 20 MIN: CPT | Mod: S$GLB,,, | Performed by: INTERNAL MEDICINE

## 2024-02-09 ENCOUNTER — TELEPHONE (OUTPATIENT)
Dept: FAMILY MEDICINE | Facility: CLINIC | Age: 39
End: 2024-02-09
Payer: MEDICARE

## 2024-02-09 NOTE — TELEPHONE ENCOUNTER
----- Message from Carrie Lynn sent at 2/9/2024 10:29 AM CST -----  The patient's dad Wilver called.The patient needs an appointment the 1st  or 2 nd week of March. He needs a 90L form filled out. Wilver  the patient's dad's # [phone]

## 2024-02-27 DIAGNOSIS — Z00.00 ENCOUNTER FOR MEDICARE ANNUAL WELLNESS EXAM: ICD-10-CM

## 2024-03-04 ENCOUNTER — OFFICE VISIT (OUTPATIENT)
Dept: FAMILY MEDICINE | Facility: CLINIC | Age: 39
End: 2024-03-04
Payer: MEDICARE

## 2024-03-04 VITALS
DIASTOLIC BLOOD PRESSURE: 70 MMHG | BODY MASS INDEX: 30.74 KG/M2 | OXYGEN SATURATION: 98 % | SYSTOLIC BLOOD PRESSURE: 108 MMHG | WEIGHT: 207.56 LBS | TEMPERATURE: 97 F | HEART RATE: 71 BPM | HEIGHT: 69 IN

## 2024-03-04 DIAGNOSIS — L50.8 CHRONIC URTICARIA: ICD-10-CM

## 2024-03-04 DIAGNOSIS — F84.0 AUTISTIC DISORDER: Primary | ICD-10-CM

## 2024-03-04 DIAGNOSIS — E78.1 PURE HYPERTRIGLYCERIDEMIA: ICD-10-CM

## 2024-03-04 PROCEDURE — 99213 OFFICE O/P EST LOW 20 MIN: CPT | Mod: S$PBB,,, | Performed by: INTERNAL MEDICINE

## 2024-03-04 PROCEDURE — 99213 OFFICE O/P EST LOW 20 MIN: CPT | Mod: PBBFAC,PN | Performed by: INTERNAL MEDICINE

## 2024-03-04 PROCEDURE — 99999 PR PBB SHADOW E&M-EST. PATIENT-LVL III: CPT | Mod: PBBFAC,,, | Performed by: INTERNAL MEDICINE

## 2024-03-04 NOTE — PROGRESS NOTES
Subjective:       Patient ID: Wilver Cee Jr. is a 38 y.o. male.    Chief Complaint: Annual Exam (90 L form)    Here for routine check up; needs 90L form filled out.  He is here with his dad who provides history.  He lives with his dad.  He is going to [facility] for services.  He has a h/o autism, idopathic urticaria, abnormal JOSH, cyclic vomiting followed by GI.  He hasn't been back to Allergy/Imunology as all his testing was negative.   He was previously treated with a fibrate for lipids but that was discontinued when he started to have issues with urticaria.    He had some labs recently with Heme/Onc; glucose was elevated but he was non fasting.  Dad would still like A1C checked with next labs.      Review of Systems   Constitutional:  Negative for activity change, appetite change, chills, diaphoresis, fatigue, fever and unexpected weight change.   HENT:  Positive for hearing loss. Negative for congestion, ear discharge, ear pain, nosebleeds, postnasal drip, rhinorrhea, sinus pressure, sinus pain, sneezing, sore throat, tinnitus, trouble swallowing and voice change.    Eyes:  Negative for photophobia, pain, discharge, redness, itching and visual disturbance.   Respiratory:  Negative for apnea, cough, choking, chest tightness, shortness of breath and wheezing.    Cardiovascular:  Negative for chest pain, palpitations and leg swelling.   Gastrointestinal:  Negative for abdominal distention, abdominal pain, blood in stool, constipation, diarrhea, nausea and vomiting.   Endocrine: Negative for cold intolerance, heat intolerance, polydipsia and polyuria.   Genitourinary:  Negative for decreased urine volume, difficulty urinating, dysuria, enuresis, flank pain, frequency, genital sores, hematuria, penile discharge, penile pain, scrotal swelling, testicular pain and urgency.   Musculoskeletal:  Negative for arthralgias, back pain, gait problem, joint swelling, myalgias, neck pain and neck stiffness.   Skin:  Negative  for rash and wound.   Allergic/Immunologic: Negative for environmental allergies, food allergies and immunocompromised state.   Neurological:  Positive for speech difficulty. Negative for dizziness, tremors, seizures, syncope, facial asymmetry, weakness, light-headedness, numbness and headaches.   Hematological:  Negative for adenopathy. Does not bruise/bleed easily.   Psychiatric/Behavioral:  Negative for confusion, decreased concentration, hallucinations, self-injury, sleep disturbance and suicidal ideas. The patient is nervous/anxious.        Past Medical History:   Diagnosis Date    Allergy     Autistic disorder     Cyclic vomiting syndrome 11/11/2020    Hyperlipidemia     Thrombocytopenia 8/19/2021    Urticaria       Past Surgical History:   Procedure Laterality Date    ADENOIDECTOMY      MULTIPLE TOOTH EXTRACTIONS      TYMPANOSTOMY TUBE PLACEMENT         Family History   Problem Relation Age of Onset    Epilepsy Mother     Hypertension Father     Heart attack Father 54    No Known Problems Sister     No Known Problems Brother     No Known Problems Daughter     No Known Problems Son     No Known Problems Maternal Aunt     No Known Problems Maternal Uncle     No Known Problems Paternal Aunt     No Known Problems Paternal Uncle     No Known Problems Maternal Grandmother     No Known Problems Maternal Grandfather     No Known Problems Paternal Grandmother     No Known Problems Paternal Grandfather        Social History     Socioeconomic History    Marital status: Single   Occupational History    Occupation: disabled   Tobacco Use    Smoking status: Never    Smokeless tobacco: Never   Substance and Sexual Activity    Alcohol use: No    Drug use: No    Sexual activity: Never   Social History Narrative    Live with father     Social Determinants of Health     Stress: Stress Concern Present (11/11/2020)    [employer] - Occupational Stress Questionnaire     Feeling of Stress : Rather much  "      Current Outpatient Medications   Medication Sig Dispense Refill    acetaminophen (TYLENOL) 500 MG tablet Take 1 tablet (500 mg total) by mouth every 4 (four) hours as needed for Pain or Temperature greater than (100.4).  0    ascorbic Acid (VITAMIN C) 500 mg CpSR Take 500 mg by mouth once daily.      cetirizine (ZYRTEC) 10 MG tablet Take 10 mg by mouth once daily. Rotates with claritin and allegra      fexofenadine (ALLEGRA) 180 MG tablet Take 180 mg by mouth once daily. Rotates with zyrtec and claritin      ibuprofen (ADVIL,MOTRIN) 200 MG tablet Take 2 tablets (400 mg total) by mouth every 6 (six) hours as needed for Pain or Temperature greater than (100.4).  0    loratadine (CLARITIN) 10 mg tablet Take 10 mg by mouth daily as needed for Allergies. Rotates with zyrtec and allegra      melatonin (MELATIN) 3 mg tablet Take 6 mg by mouth nightly as needed for Insomnia.      ondansetron (ZOFRAN-ODT) 8 MG TbDL Take 1 tablet (8 mg total) by mouth 3 (three) times daily as needed (vomiting). 15 tablet 3    vitamin D (VITAMIN D3) 1000 units Tab Take 2,000 Units by mouth once daily.       No current facility-administered medications for this visit.       Review of patient's allergies indicates:   Allergen Reactions    Promethazine-dm Other (See Comments)     aggressive behavior, anxiety,  confusion, irritability, dizziness, and diarrhea.     Objective:    HPI     Annual Exam     Additional comments: 90 L form          Last edited by Cris Crockett MA on 3/4/2024 10:54 AM.      Blood pressure 108/70, pulse 71, temperature 96.5 °F (35.8 °C), temperature source Temporal, height 5' 9" (1.753 m), weight 94.2 kg (207 lb 9 oz), SpO2 98 %. Body mass index is 30.65 kg/m².   Physical Exam        Assessment:       1. Autistic disorder    2. Pure hypertriglyceridemia    3. Chronic urticaria        Plan:       Wilver was seen today for annual exam.    Diagnoses and all orders for this visit:    Autistic disorder    Pure " hypertriglyceridemia  -     Lipid Panel; Future    Chronic urticaria  Comments:  Alternates antihistamines.

## 2024-03-21 ENCOUNTER — PATIENT MESSAGE (OUTPATIENT)
Dept: FAMILY MEDICINE | Facility: CLINIC | Age: 39
End: 2024-03-21
Payer: MEDICARE

## 2024-03-21 RX ORDER — NYSTATIN 100000 [USP'U]/G
POWDER TOPICAL 2 TIMES DAILY
Qty: 60 G | Refills: 1 | Status: SHIPPED | OUTPATIENT
Start: 2024-03-21

## 2024-03-21 NOTE — TELEPHONE ENCOUNTER
Spoke to pt father he states that he need nystop powder because the one he has on  hand is  and would like another just in case the pt gets another yeast infection. [facility]

## 2024-05-10 ENCOUNTER — TELEPHONE (OUTPATIENT)
Dept: FAMILY MEDICINE | Facility: CLINIC | Age: 39
End: 2024-05-10
Payer: MEDICARE

## 2024-05-10 NOTE — TELEPHONE ENCOUNTER
----- Message from Angy Poon sent at 5/10/2024 10:53 AM CDT -----  Pt would like to schedule an appointment with Homer Sotelo. He has seen Dr. Nix, but they want to get est with Homer Sotelo. He has a growth between two of his toes and they want him to get it checked out.      Wilver MOSQUEDA (Dad) [phone]

## 2024-05-23 ENCOUNTER — OFFICE VISIT (OUTPATIENT)
Dept: FAMILY MEDICINE | Facility: CLINIC | Age: 39
End: 2024-05-23
Payer: MEDICARE

## 2024-05-23 VITALS
DIASTOLIC BLOOD PRESSURE: 74 MMHG | HEIGHT: 69 IN | HEART RATE: 84 BPM | OXYGEN SATURATION: 98 % | BODY MASS INDEX: 31.84 KG/M2 | WEIGHT: 215 LBS | SYSTOLIC BLOOD PRESSURE: 122 MMHG

## 2024-05-23 DIAGNOSIS — L60.8 TOENAIL DEFORMITY: ICD-10-CM

## 2024-05-23 DIAGNOSIS — L84 CALLUS OF FOOT: ICD-10-CM

## 2024-05-23 DIAGNOSIS — R73.01 IFG (IMPAIRED FASTING GLUCOSE): Primary | ICD-10-CM

## 2024-05-23 LAB — HBA1C MFR BLD: 5.1 %

## 2024-05-23 PROCEDURE — 99213 OFFICE O/P EST LOW 20 MIN: CPT | Mod: S$GLB,,, | Performed by: NURSE PRACTITIONER

## 2024-05-23 PROCEDURE — 83036 HEMOGLOBIN GLYCOSYLATED A1C: CPT | Mod: QW,,, | Performed by: NURSE PRACTITIONER

## 2024-05-23 NOTE — PROGRESS NOTES
SUBJECTIVE:      Patient ID: Wilver Cee Jr. is a 38 y.o. male.    Chief Complaint: Foot Pain (Transfer care from Dr. Nix. Growth between 4 & 5th toes, right foot. Noticed  1month ago while doing foot soaks. Dr. Laureano appt not until July.)    Patient is here today with her dad, I am meeting this pt for the first time today. Previously following with Dr Nix.   He has a h/o autism, mental retardation,idopathic urticaria, abnormal JOSH, cyclic vomiting and thrombocytopenia.  He is complaining of a discolored spot on his right great toenail and a growth between his 4th and 5th right toe. No pain, no injury to nail or toe, no redness, no discharge        Past Surgical History:   Procedure Laterality Date    ADENOIDECTOMY      MULTIPLE TOOTH EXTRACTIONS      TYMPANOSTOMY TUBE PLACEMENT       Family History   Problem Relation Name Age of Onset    Epilepsy Mother      Hypertension Father      Heart attack Father  54    No Known Problems Sister      No Known Problems Brother      No Known Problems Daughter      No Known Problems Son      No Known Problems Maternal Aunt      No Known Problems Maternal Uncle      No Known Problems Paternal Aunt      No Known Problems Paternal Uncle      No Known Problems Maternal Grandmother      No Known Problems Maternal Grandfather      No Known Problems Paternal Grandmother      No Known Problems Paternal Grandfather        Social History     Socioeconomic History    Marital status: Single   Occupational History    Occupation: disabled   Tobacco Use    Smoking status: Never    Smokeless tobacco: Never   Substance and Sexual Activity    Alcohol use: No    Drug use: No    Sexual activity: Never   Social History Narrative    Live with father     Social Determinants of Health     Stress: Stress Concern Present (11/11/2020)    [employer] - Occupational Stress Questionnaire     Feeling of Stress : Rather much     Current Outpatient Medications    Medication Sig Dispense Refill    acetaminophen (TYLENOL) 500 MG tablet Take 1 tablet (500 mg total) by mouth every 4 (four) hours as needed for Pain or Temperature greater than (100.4).  0    ascorbic Acid (VITAMIN C) 500 mg CpSR Take 500 mg by mouth once daily.      cetirizine (ZYRTEC) 10 MG tablet Take 10 mg by mouth once daily. Rotates with claritin and allegra      fexofenadine (ALLEGRA) 180 MG tablet Take 180 mg by mouth once daily. Rotates with zyrtec and claritin      loratadine (CLARITIN) 10 mg tablet Take 10 mg by mouth daily as needed for Allergies. Rotates with zyrtec and allegra      melatonin (MELATIN) 3 mg tablet Take 6 mg by mouth nightly as needed for Insomnia.      nystatin (MYCOSTATIN) powder Apply topically 2 (two) times daily. 60 g 1    ondansetron (ZOFRAN-ODT) 8 MG TbDL Take 1 tablet (8 mg total) by mouth 3 (three) times daily as needed (vomiting). 15 tablet 3    vitamin D (VITAMIN D3) 1000 units Tab Take 2,000 Units by mouth once daily.      ibuprofen (ADVIL,MOTRIN) 200 MG tablet Take 2 tablets (400 mg total) by mouth every 6 (six) hours as needed for Pain or Temperature greater than (100.4). (Patient not taking: Reported on 5/23/2024)  0     No current facility-administered medications for this visit.     Review of patient's allergies indicates:   Allergen Reactions    Promethazine-dm Other (See Comments)     aggressive behavior, anxiety,  confusion, irritability, dizziness, and diarrhea.      Past Medical History:   Diagnosis Date    Allergy     Autistic disorder     Cyclic vomiting syndrome 11/11/2020    Hyperlipidemia     Thrombocytopenia 8/19/2021    Urticaria      Past Surgical History:   Procedure Laterality Date    ADENOIDECTOMY      MULTIPLE TOOTH EXTRACTIONS      TYMPANOSTOMY TUBE PLACEMENT         Review of Systems   Constitutional:  Negative for appetite change, chills, diaphoresis and unexpected weight change.   HENT:  Negative for ear discharge, facial swelling, hearing loss,  "nosebleeds and trouble swallowing.    Eyes:  Negative for photophobia, pain and visual disturbance.   Respiratory:  Negative for apnea, choking, shortness of breath and wheezing.    Cardiovascular:  Negative for chest pain and palpitations.   Gastrointestinal:  Negative for abdominal pain, blood in stool and vomiting.   Endocrine: Negative for polyphagia.   Genitourinary:  Negative for difficulty urinating and hematuria.   Musculoskeletal:  Negative for gait problem and joint swelling.   Skin:  Negative for pallor.   Neurological:  Positive for syncope. Negative for dizziness, seizures, speech difficulty and weakness.   Hematological:  Does not bruise/bleed easily.   Psychiatric/Behavioral:  Negative for agitation and confusion.       OBJECTIVE:      Vitals:    05/23/24 1310   BP: 122/74   Pulse: 84   SpO2: 98%   Weight: 97.5 kg (215 lb)   Height: 5' 9" (1.753 m)     Physical Exam  Vitals and nursing note reviewed.   Constitutional:       General: He is not in acute distress.     Appearance: He is well-developed.   HENT:      Head: Normocephalic and atraumatic.      Nose: Nose normal.      Mouth/Throat:      Pharynx: Uvula midline.   Eyes:      General: Lids are normal.      Conjunctiva/sclera: Conjunctivae normal.      Pupils: Pupils are equal, round, and reactive to light.      Right eye: Pupil is round and reactive.      Left eye: Pupil is round and reactive.   Neck:      Thyroid: No thyromegaly.      Vascular: No carotid bruit.   Cardiovascular:      Rate and Rhythm: Normal rate and regular rhythm.      Pulses: Normal pulses.      Heart sounds: Normal heart sounds.   Pulmonary:      Effort: Pulmonary effort is normal.      Breath sounds: Normal breath sounds.   Abdominal:      General: Bowel sounds are normal.      Palpations: Abdomen is soft. Abdomen is not rigid.      Tenderness: There is no abdominal tenderness.   Musculoskeletal:         General: Normal range of motion.      Cervical back: Normal range of " motion and neck supple.   Feet:      Comments: Right great toenail with small irregular brown discoloration to lateral nail. Callus noted between 4th and 5th toe  Lymphadenopathy:      Cervical: No cervical adenopathy.   Skin:     General: Skin is warm and dry.      Nails: There is no clubbing.   Neurological:      Mental Status: He is alert. Mental status is at baseline.   Psychiatric:         Mood and Affect: Mood normal.         Speech: Speech normal.         Behavior: Behavior is cooperative.        Last visit note, most recent available labs, and health maintenance reviewed  Office Visit on 05/23/2024   Component Date Value Ref Range Status    Hemoglobin A1C, POC 05/23/2024 5.1  % Final   ]  Assessment:       1. IFG (impaired fasting glucose)    2. Toenail deformity    3. Callus of foot        Plan:       IFG (impaired fasting glucose)  -     POCT HEMOGLOBIN A1C    Toenail deformity  -     Ambulatory referral/consult to Podiatry; Future; Expected date: 05/30/2024    Callus of foot  -     Ambulatory referral/consult to Podiatry; Future; Expected date: 05/30/2024        Follow up in about 9 months (around 2/23/2025), or if symptoms worsen or fail to improve.      5/23/2024 Homer Sotelo, SOBIA, FNP

## 2024-05-30 ENCOUNTER — OFFICE VISIT (OUTPATIENT)
Dept: PODIATRY | Facility: CLINIC | Age: 39
End: 2024-05-30
Payer: MEDICARE

## 2024-05-30 VITALS — BODY MASS INDEX: 31.94 KG/M2 | HEIGHT: 69 IN | WEIGHT: 215.63 LBS

## 2024-05-30 DIAGNOSIS — S90.211A SUBUNGUAL HEMATOMA OF GREAT TOE OF RIGHT FOOT, INITIAL ENCOUNTER: Primary | ICD-10-CM

## 2024-05-30 DIAGNOSIS — M20.42 HAMMER TOES OF BOTH FEET: ICD-10-CM

## 2024-05-30 DIAGNOSIS — B35.3 TINEA PEDIS OF BOTH FEET: ICD-10-CM

## 2024-05-30 DIAGNOSIS — M20.41 HAMMER TOES OF BOTH FEET: ICD-10-CM

## 2024-05-30 DIAGNOSIS — L84 CALLUS OF FOOT: ICD-10-CM

## 2024-05-30 DIAGNOSIS — L60.8 TOENAIL DEFORMITY: ICD-10-CM

## 2024-05-30 PROCEDURE — 99213 OFFICE O/P EST LOW 20 MIN: CPT | Mod: PBBFAC,PN | Performed by: PODIATRIST

## 2024-05-30 PROCEDURE — 99999 PR PBB SHADOW E&M-EST. PATIENT-LVL III: CPT | Mod: PBBFAC,,, | Performed by: PODIATRIST

## 2024-05-30 PROCEDURE — 99203 OFFICE O/P NEW LOW 30 MIN: CPT | Mod: S$PBB,,, | Performed by: PODIATRIST

## 2024-05-30 NOTE — PROGRESS NOTES
"  [address] [place] [address]  Phone: [phone]   [phone]    Patient's PCP:Homer Sotelo NP  Referring Provider: Dr. Tulio GARNER*    Subjective:      Chief Complaint:: Nail Problem (Black spot on right foot big toe )    Nail Problem  Pertinent negatives include no abdominal pain, arthralgias, chest pain, chills, coughing, fatigue, fever, headaches, joint swelling, myalgias, nausea, neck pain, numbness, rash or weakness.     Wilver Cee Jr. is a 38 y.o. male who presents today with a complaint of black spot on nail. The current episode started about 4 weeks.  The symptoms include black spot on right. Probable cause of complaint new shoes.  The symptoms are aggravated by unknown. The problem has stayed the same. Treatment to date have included foot soaks which provided no relief.         Vitals:    05/30/24 1451   Weight: 97.8 kg (215 lb 9.8 oz)   Height: 5' 9" (1.753 m)   PainSc: 0-No pain      Shoe Size: 11 extra wide    Past Surgical History:   Procedure Laterality Date    ADENOIDECTOMY      MULTIPLE TOOTH EXTRACTIONS      TYMPANOSTOMY TUBE PLACEMENT       Past Medical History:   Diagnosis Date    Allergy     Autistic disorder     Cyclic vomiting syndrome 11/11/2020    Hyperlipidemia     Thrombocytopenia 8/19/2021    Urticaria      Family History   Problem Relation Name Age of Onset    Epilepsy Mother      Hypertension Father      Heart attack Father  54    No Known Problems Sister      No Known Problems Brother      No Known Problems Daughter      No Known Problems Son      No Known Problems Maternal Aunt      No Known Problems Maternal Uncle      No Known Problems Paternal Aunt      No Known Problems Paternal Uncle      No Known Problems Maternal Grandmother      No Known Problems Maternal Grandfather      No Known Problems Paternal Grandmother      No Known Problems Paternal Grandfather          Social History:   Marital Status: Single  Alcohol History:  reports " no history of alcohol use.  Tobacco History:  reports that he has never smoked. He has never used smokeless tobacco.  Drug History:  reports no history of drug use.    Review of patient's allergies indicates:   Allergen Reactions    Promethazine-dm Other (See Comments)     aggressive behavior, anxiety,  confusion, irritability, dizziness, and diarrhea.       Current Outpatient Medications   Medication Sig Dispense Refill    acetaminophen (TYLENOL) 500 MG tablet Take 1 tablet (500 mg total) by mouth every 4 (four) hours as needed for Pain or Temperature greater than (100.4).  0    ascorbic Acid (VITAMIN C) 500 mg CpSR Take 500 mg by mouth once daily.      cetirizine (ZYRTEC) 10 MG tablet Take 10 mg by mouth once daily. Rotates with claritin and allegra      fexofenadine (ALLEGRA) 180 MG tablet Take 180 mg by mouth once daily. Rotates with zyrtec and claritin      ibuprofen (ADVIL,MOTRIN) 200 MG tablet Take 2 tablets (400 mg total) by mouth every 6 (six) hours as needed for Pain or Temperature greater than (100.4). (Patient not taking: Reported on 5/23/2024)  0    loratadine (CLARITIN) 10 mg tablet Take 10 mg by mouth daily as needed for Allergies. Rotates with zyrtec and allegra      melatonin (MELATIN) 3 mg tablet Take 6 mg by mouth nightly as needed for Insomnia.      nystatin (MYCOSTATIN) powder Apply topically 2 (two) times daily. 60 g 1    ondansetron (ZOFRAN-ODT) 8 MG TbDL Take 1 tablet (8 mg total) by mouth 3 (three) times daily as needed (vomiting). 15 tablet 3    vitamin D (VITAMIN D3) 1000 units Tab Take 2,000 Units by mouth once daily.       No current facility-administered medications for this visit.       Review of Systems   Constitutional:  Negative for chills, fatigue, fever and unexpected weight change.   HENT:  Negative for hearing loss and trouble swallowing.    Eyes:  Negative for photophobia and visual disturbance.   Respiratory:  Negative for cough, shortness of breath and wheezing.     Cardiovascular:  Negative for chest pain, palpitations and leg swelling.   Gastrointestinal:  Negative for abdominal pain and nausea.   Genitourinary:  Negative for dysuria and frequency.   Musculoskeletal:  Negative for arthralgias, back pain, gait problem, joint swelling, myalgias and neck pain.   Skin:  Negative for rash and wound.   Neurological:  Negative for tremors, seizures, weakness, numbness and headaches.   Hematological:  Does not bruise/bleed easily.         Objective:        Physical Exam:   Foot Exam    General  General Appearance: appears stated age and healthy   Orientation: alert and oriented to person, place, and time   Affect: appropriate   Gait: unimpaired       Right Foot/Ankle     Inspection and Palpation  Right foot ecchymosis: Small subungual hematoma right 1st toenail with no signs of infection no pain.  Tenderness: (Lateral 4th toe)  Hammertoes: second toe, third toe, fourth toe and fifth toe  Skin Exam: tinea and corn (Lateral 4th toe);   Neurovascular  Dorsalis pedis: 2+  Posterior tibial: 2+  Capillary Refill: 2+  Saphenous nerve sensation: normal  Tibial nerve sensation: normal  Superficial peroneal nerve sensation: normal  Deep peroneal nerve sensation: normal  Sural nerve sensation: normal      Left Foot/Ankle      Inspection and Palpation  Hammertoes: absent  Skin Exam: tinea;   Neurovascular  Dorsalis pedis: 2+  Posterior tibial: 2+  Capillary refill: 2+  Saphenous nerve sensation: normal  Tibial nerve sensation: normal  Superficial peroneal nerve sensation: normal  Deep peroneal nerve sensation: normal  Sural nerve sensation: normal        Physical Exam  Cardiovascular:      Pulses:           Dorsalis pedis pulses are 2+ on the right side and 2+ on the left side.        Posterior tibial pulses are 2+ on the right side and 2+ on the left side.                   Vascular Exam     Right Pulses  Dorsalis Pedis:      2+  Posterior Tibial:      2+        Left Pulses  Dorsalis Pedis:       2+  Posterior Tibial:      2+           Imaging:            Assessment:       1. Subungual hematoma of great toe of right foot, initial encounter    2. Toenail deformity    3. Callus of foot    4. Tinea pedis of both feet    5. Hammer toes of both feet      Plan:   Subungual hematoma of great toe of right foot, initial encounter    Toenail deformity  -     Ambulatory referral/consult to Podiatry    Callus of foot  -     Ambulatory referral/consult to Podiatry    Tinea pedis of both feet    Hammer toes of both feet    One patient's father will monitor the small hematoma subungual right 1st toe it should grow out over the next 3-4 months.  I told him if it does not rule out been in the nail and need to be biopsied to make sure there was no melanoma.  Since it was sudden onset makes it possible for this to be melanoma in more likely is due to trauma.    He will use a pad between the 4th and 5th there was decreased depression he would normal motor lay if this helps      Procedures          Counseling:     I provided patient education verbally regarding:   Patient diagnosis, treatment options, as well as alternatives, risks, and benefits.     This note was created using Dragon voice recognition software that occasionally misinterpreted phrases or words.

## 2024-07-18 ENCOUNTER — LAB VISIT (OUTPATIENT)
Dept: LAB | Facility: HOSPITAL | Age: 39
End: 2024-07-18
Attending: INTERNAL MEDICINE
Payer: MEDICARE

## 2024-07-18 DIAGNOSIS — D69.6 THROMBOCYTOPENIA: ICD-10-CM

## 2024-07-18 LAB
ALBUMIN SERPL BCP-MCNC: 4 G/DL (ref 3.5–5.2)
ALP SERPL-CCNC: 92 U/L (ref 55–135)
ALT SERPL W/O P-5'-P-CCNC: 11 U/L (ref 10–44)
ANION GAP SERPL CALC-SCNC: 6 MMOL/L (ref 8–16)
AST SERPL-CCNC: 11 U/L (ref 10–40)
BASOPHILS # BLD AUTO: 0.01 K/UL (ref 0–0.2)
BASOPHILS NFR BLD: 0.1 % (ref 0–1.9)
BILIRUB SERPL-MCNC: 0.4 MG/DL (ref 0.1–1)
BUN SERPL-MCNC: 16 MG/DL (ref 6–20)
CALCIUM SERPL-MCNC: 8.9 MG/DL (ref 8.7–10.5)
CHLORIDE SERPL-SCNC: 107 MMOL/L (ref 95–110)
CO2 SERPL-SCNC: 27 MMOL/L (ref 23–29)
CREAT SERPL-MCNC: 1 MG/DL (ref 0.5–1.4)
DIFFERENTIAL METHOD BLD: ABNORMAL
EOSINOPHIL # BLD AUTO: 0.1 K/UL (ref 0–0.5)
EOSINOPHIL NFR BLD: 1.3 % (ref 0–8)
ERYTHROCYTE [DISTWIDTH] IN BLOOD BY AUTOMATED COUNT: 12.3 % (ref 11.5–14.5)
EST. GFR  (NO RACE VARIABLE): >60 ML/MIN/1.73 M^2
GLUCOSE SERPL-MCNC: 105 MG/DL (ref 70–110)
HCT VFR BLD AUTO: 46.2 % (ref 40–54)
HGB BLD-MCNC: 15.7 G/DL (ref 14–18)
IMM GRANULOCYTES # BLD AUTO: 0.04 K/UL (ref 0–0.04)
IMM GRANULOCYTES NFR BLD AUTO: 0.6 % (ref 0–0.5)
LYMPHOCYTES # BLD AUTO: 1.6 K/UL (ref 1–4.8)
LYMPHOCYTES NFR BLD: 24.3 % (ref 18–48)
MCH RBC QN AUTO: 30.4 PG (ref 27–31)
MCHC RBC AUTO-ENTMCNC: 34 G/DL (ref 32–36)
MCV RBC AUTO: 90 FL (ref 82–98)
MONOCYTES # BLD AUTO: 0.3 K/UL (ref 0.3–1)
MONOCYTES NFR BLD: 4.4 % (ref 4–15)
NEUTROPHILS # BLD AUTO: 4.7 K/UL (ref 1.8–7.7)
NEUTROPHILS NFR BLD: 69.3 % (ref 38–73)
NRBC BLD-RTO: 0 /100 WBC
PLATELET # BLD AUTO: 111 K/UL (ref 150–450)
PMV BLD AUTO: 10.4 FL (ref 9.2–12.9)
POTASSIUM SERPL-SCNC: 4 MMOL/L (ref 3.5–5.1)
PROT SERPL-MCNC: 6.6 G/DL (ref 6–8.4)
RBC # BLD AUTO: 5.16 M/UL (ref 4.6–6.2)
SODIUM SERPL-SCNC: 140 MMOL/L (ref 136–145)
WBC # BLD AUTO: 6.75 K/UL (ref 3.9–12.7)

## 2024-07-18 PROCEDURE — 80053 COMPREHEN METABOLIC PANEL: CPT | Performed by: INTERNAL MEDICINE

## 2024-07-18 PROCEDURE — 85025 COMPLETE CBC W/AUTO DIFF WBC: CPT | Performed by: INTERNAL MEDICINE

## 2024-07-18 PROCEDURE — 36415 COLL VENOUS BLD VENIPUNCTURE: CPT | Performed by: INTERNAL MEDICINE

## 2024-07-30 NOTE — PROGRESS NOTES
"[facility] Hematology/Oncology  PROGRESS NOTE -  Follow-up Visit      Subjective:       Patient ID:   NAME: Wilver Cee Jr. : 1985     39 y.o. male    Referring Doc: Saji Eason, *  Other Physicians: Greg Smith; Juan; Roxanne; Jimenez Solis  (ENT)           Chief Complaint: tcp f/u       History of Present Illness:     Patient returns today for a regularly scheduled follow-up visit.  The patient is here today to go over the results of the recently ordered labs, tests and studies. He is here with his dad.       He is feeling ok. He reports that he is "good". No excessive bleeding and bruising. No CP, SOB, HA's or N/V.     He developed a hematoma under the nail of his right big toe about 8 weeks ago. He had recently had new shoes.  saw Homer Sotelo NP and then Dr Saini with podiatry. The hematoma seems to be growing out along with the nail.     He has chronic hives issues since about 2022 and sees Dr Poncho Laureano and Dr Martinez    He saw Homer Sotelo NP on 2024      Discussed covid precautions and he has had his vaccinations            ROS:   GEN: normal without any fever, night sweats or weight loss; chronic hives and LBP; recent hematoma under nail of right big toe  HEENT: normal with no HA's, sore throat, stiff neck, changes in vision  CV: normal with no CP, SOB, PND, CRUZ or orthopnea  PULM: normal with no SOB, cough, hemoptysis, sputum or pleuritic pain  GI: normal with no abdominal pain, nausea, vomiting, constipation, diarrhea, melanotic stools, BRBPR, or hematemesis  : normal with no hematuria, dysuria  BREAST: normal with no mass, discharge, pain  SKIN: normal with no rash, erythema, bruising, or swelling    Pain Scale: 0    Allergies:  Review of patient's allergies indicates:   Allergen Reactions    Promethazine-dm Other (See Comments)     aggressive behavior, anxiety,  confusion, irritability, dizziness, and diarrhea.       Medications:    Current Outpatient Medications:     " "acetaminophen (TYLENOL) 500 MG tablet, Take 1 tablet (500 mg total) by mouth every 4 (four) hours as needed for Pain or Temperature greater than (100.4)., Disp: , Rfl: 0    ascorbic Acid (VITAMIN C) 500 mg CpSR, Take 500 mg by mouth once daily., Disp: , Rfl:     cetirizine (ZYRTEC) 10 MG tablet, Take 10 mg by mouth once daily. Rotates with claritin and allegra, Disp: , Rfl:     fexofenadine (ALLEGRA) 180 MG tablet, Take 180 mg by mouth once daily. Rotates with zyrtec and claritin, Disp: , Rfl:     loratadine (CLARITIN) 10 mg tablet, Take 10 mg by mouth daily as needed for Allergies. Rotates with zyrtec and allegra, Disp: , Rfl:     melatonin (MELATIN) 3 mg tablet, Take 6 mg by mouth nightly as needed for Insomnia., Disp: , Rfl:     nystatin (MYCOSTATIN) powder, Apply topically 2 (two) times daily., Disp: 60 g, Rfl: 1    ondansetron (ZOFRAN-ODT) 8 MG TbDL, Take 1 tablet (8 mg total) by mouth 3 (three) times daily as needed (vomiting)., Disp: 15 tablet, Rfl: 3    vitamin D (VITAMIN D3) 1000 units Tab, Take 2,000 Units by mouth once daily., Disp: , Rfl:     ibuprofen (ADVIL,MOTRIN) 200 MG tablet, Take 2 tablets (400 mg total) by mouth every 6 (six) hours as needed for Pain or Temperature greater than (100.4). (Patient not taking: Reported on 5/23/2024), Disp: , Rfl: 0    PMHx/PSHx Updates:  See patient's last visit with me on 1/31/2024  See H&P on 8/20/2021        Pathology:   Cancer Staging   No matching staging information was found for the patient.            Objective:     Vitals:  Blood pressure 113/72, pulse 72, temperature 97 °F (36.1 °C), temperature source Temporal, resp. rate 14, height 5' 9" (1.753 m), weight 96.5 kg (212 lb 12.8 oz).    Physical Examination:   GEN: no apparent distress, comfortable; awake and answers questions; overweight  HEAD: atraumatic and normocephalic  EYES: no pallor, no icterus, PERRLA  ENT: OMM, no pharyngeal erythema, external ears WNL; no nasal discharge; no thrush  NECK: no " masses, thyroid normal, trachea midline, no LAD/LN's, supple  CV: RRR with no murmur; normal pulse; normal S1 and S2; no pedal edema  CHEST: Normal respiratory effort; CTAB; normal breath sounds; no wheeze or crackles  ABDOM: nontender and nondistended; soft; normal bowel sounds; no rebound/guarding  MUSC/Skeletal: ROM normal; no crepitus; joints normal; no deformities or arthropathy  EXTREM: no clubbing, cyanosis, inflammation or swelling; small hematoma under nail of right big toe which seems to be appropriately growing out  SKIN: no rashes, lesions, ulcers, petechiae or subcutaneous nodules; chronic but nonpuritic urticaria on arms, legs and trunk  : no laird  NEURO: grossly intact; motor/sensory WNL; moving all 4 extremities; no tremors  PSYCH: normal mood, affect and behavior  LYMPH: normal cervical, supraclavicular, axillary and groin LN's        Labs:     Lab Results   Component Value Date    WBC 6.75 07/18/2024    HGB 15.7 07/18/2024    HCT 46.2 07/18/2024    MCV 90 07/18/2024     (L) 07/18/2024     CMP  Sodium   Date Value Ref Range Status   07/18/2024 140 136 - 145 mmol/L Final   09/20/2018 136 134 - 144 mmol/L      Potassium   Date Value Ref Range Status   07/18/2024 4.0 3.5 - 5.1 mmol/L Final     Chloride   Date Value Ref Range Status   07/18/2024 107 95 - 110 mmol/L Final   09/20/2018 103 98 - 110 mmol/L      CO2   Date Value Ref Range Status   07/18/2024 27 23 - 29 mmol/L Final     Glucose   Date Value Ref Range Status   07/18/2024 105 70 - 110 mg/dL Final   09/20/2018 106 (H) 70 - 99 mg/dL      BUN   Date Value Ref Range Status   07/18/2024 16 6 - 20 mg/dL Final     Creatinine   Date Value Ref Range Status   07/18/2024 1.0 0.5 - 1.4 mg/dL Final   09/20/2018 0.97 0.60 - 1.40 mg/dL      Calcium   Date Value Ref Range Status   07/18/2024 8.9 8.7 - 10.5 mg/dL Final     Total Protein   Date Value Ref Range Status   07/18/2024 6.6 6.0 - 8.4 g/dL Final     Albumin   Date Value Ref Range Status    07/18/2024 4.0 3.5 - 5.2 g/dL Final   09/20/2018 3.9 3.1 - 4.7 g/dL      Total Bilirubin   Date Value Ref Range Status   07/18/2024 0.4 0.1 - 1.0 mg/dL Final     Comment:     For infants and newborns, interpretation of results should be based  on gestational age, weight and in agreement with clinical  observations.    Premature Infant recommended reference ranges:  Up to 24 hours.............<8.0 mg/dL  Up to 48 hours............<12.0 mg/dL  3-5 days..................<15.0 mg/dL  6-29 days.................<15.0 mg/dL       Alkaline Phosphatase   Date Value Ref Range Status   07/18/2024 92 55 - 135 U/L Final     AST   Date Value Ref Range Status   07/18/2024 11 10 - 40 U/L Final     ALT   Date Value Ref Range Status   07/18/2024 11 10 - 44 U/L Final     Anion Gap   Date Value Ref Range Status   07/18/2024 6 (L) 8 - 16 mmol/L Final     eGFR if    Date Value Ref Range Status   04/06/2022 106 > OR = 60 mL/min/1.73m2 Final     eGFR if non    Date Value Ref Range Status   04/06/2022 91 > OR = 60 mL/min/1.73m2 Final       Platelet Antibody, Direct, Flow Cytometry  Order: [id number]  Status:  Final result   Visible to patient:  Yes (seen) Next appt:  12/01/2021 at 03:15 PM in Rheumatology (Saji Eason MD) Dx:  Thrombocytopenia; Other fatigue   0 Result Notes   Ref Range & Units 1 mo ago   ANTI-IGG NEGATIVE NEGATIVE              Platelet Ab.IgG NEGATIVE NEGATIVE    Platelet Ab.IgM NEGATIVE NEGATIVE      HIV Ag/Ab 4th Gen NON-REACTIVE     Hep A IgM NON-REACTIVE NON-REACTIVE    Comment  For additional information, please refer to http://education.[employer]/faq/IRU881 (This link is being provided for informational/ educational purposes only.)   Hepatitis B Surface Ag NON-REACTIVE NON-REACTIVE    Hep B C IgM NON-REACTIVE NON-REACTIVE    Hepatitis C Ab NON-REACTIVE NON-REACTIVE    Signal/Cutoff <1.00 0.01    Comment:     HCV antibody was non-reactive     EBV Interp.  Suggestive  of a recent Dylan-Barr virus infection         Radiology/Diagnostic Studies:    No results found.    I have reviewed all available lab results and radiology reports.    Assessment/Plan:   (1) 39 y.o. male with diagnosis of thrombocytopenia who has been referred by Saji Eason, * for evaluation by medical hematology/oncology.   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; the mild splenomegaly fits in with this diagnosis    12/29/2021:  - plats at 109,000 and adequate    6/29/2022:  - latest plats at 111,000 and adequate for him  - Hgb and WBC were both normal  - no vomiting episodes since last year  - he did not go see neurology    12/28/2022:  - plats 104,000 and relatively stable  - suspect he has some form of underlying autoimmune mediated process    6/28/2023:  - latest plats at 95,000 and relatively stable    1/31/2024:  - no anemia; WBC adequate  - platelets at 97,000 and relatively stable    7/31/2024:  - CBC adequate  - plats at 111,000  - no current anemia     (2) Autistic disorder and MR     (3) Hypercholesterolemia     (4) Chronic urticaria - followed by Dr Martinez     (5) Cyclic vomiting syndrome - followed by Dr Oliveira  - ? Some sort of gastric or intestinal motility issue due to his underlying autoimmune process    6/28/2023:  - recent trial of amitriptyline per Dr Oliveira's direction  - recent vomiting episode about 2 weeks ago     (6) Positive JOSH screen     (7) Anxiety    (8) Small hematoma under nail of right big toe - seen by Dr Saini - will ask Dr Laureano to see him as well          VISIT DIAGNOSES:      Thrombocytopenia          PLAN:  1. continue with  supportive care measures and observation  2. CBC every 3 months  3. Avoid NSAIDS, ASA products etc if at all possible  4. F/u with podiatry for the nailbed hematoma - will ask Dr Laureano to see patient as well  5. F/u with PCP, GI, Rheum     RTC in 6 months  Fax note to Homer Sotelo, Roxanne, Juan; Yeny/ramon, Poncho Laureano    Discussion:     COVID-19 Discussion:    I had long discussion with patient and any applicable family about the COVID-19 coronavirus epidemic and the recommended precautions with regard to cancer and/or hematology patients. I have re-iterated the CDC recommendations for adequate hand washing, use of hand -like products, and coughing into elbow, etc. In addition, especially for our patients who are on chemotherapy and/or our otherwise immunocompromised patients, I have recommended avoidance of crowds, including movie theaters, restaurants, churches, etc. I have recommended avoidance of any sick or symptomatic family members and/or friends. I have also recommended avoidance of any raw and unwashed food products, and general avoidance of food items that have not been prepared by themselves. The patient has been asked to call us immediately with any symptom developments, issues, questions or other general concerns.     I spent over 25 mins of time with the patient. Reviewed results of the recently ordered labs, tests and studies; made directives with regards to the results. Over half of this time was spent couseling and coordinating care.    I have explained all of the above in detail and the patient understands all of the current recommendation(s). I have answered all of their questions to the best of my ability and to their complete satisfaction.   The patient is to continue with the current management plan.            Electronically signed by Alexis Gomes MD

## 2024-07-31 ENCOUNTER — OFFICE VISIT (OUTPATIENT)
Facility: CLINIC | Age: 39
End: 2024-07-31
Payer: MEDICARE

## 2024-07-31 VITALS
WEIGHT: 212.81 LBS | HEIGHT: 69 IN | BODY MASS INDEX: 31.52 KG/M2 | RESPIRATION RATE: 14 BRPM | SYSTOLIC BLOOD PRESSURE: 113 MMHG | TEMPERATURE: 97 F | HEART RATE: 72 BPM | DIASTOLIC BLOOD PRESSURE: 72 MMHG

## 2024-07-31 DIAGNOSIS — L60.9 NAIL ABNORMALITY: ICD-10-CM

## 2024-07-31 DIAGNOSIS — D69.6 THROMBOCYTOPENIA: Primary | ICD-10-CM

## 2024-07-31 PROCEDURE — 99999 PR PBB SHADOW E&M-EST. PATIENT-LVL IV: CPT | Mod: PBBFAC,,, | Performed by: INTERNAL MEDICINE

## 2024-07-31 PROCEDURE — 99214 OFFICE O/P EST MOD 30 MIN: CPT | Mod: PBBFAC,PN | Performed by: INTERNAL MEDICINE

## 2025-01-27 ENCOUNTER — LAB VISIT (OUTPATIENT)
Dept: LAB | Facility: HOSPITAL | Age: 40
End: 2025-01-27
Attending: INTERNAL MEDICINE
Payer: MEDICARE

## 2025-01-27 DIAGNOSIS — D69.6 THROMBOCYTOPENIA: ICD-10-CM

## 2025-01-27 LAB
ALBUMIN SERPL BCP-MCNC: 4.2 G/DL (ref 3.5–5.2)
ALP SERPL-CCNC: 97 U/L (ref 55–135)
ALT SERPL W/O P-5'-P-CCNC: 13 U/L (ref 10–44)
ANION GAP SERPL CALC-SCNC: 6 MMOL/L (ref 8–16)
AST SERPL-CCNC: 14 U/L (ref 10–40)
BASOPHILS # BLD AUTO: 0.01 K/UL (ref 0–0.2)
BASOPHILS NFR BLD: 0.2 % (ref 0–1.9)
BILIRUB SERPL-MCNC: 0.5 MG/DL (ref 0.1–1)
BUN SERPL-MCNC: 16 MG/DL (ref 6–20)
CALCIUM SERPL-MCNC: 9.3 MG/DL (ref 8.7–10.5)
CHLORIDE SERPL-SCNC: 105 MMOL/L (ref 95–110)
CO2 SERPL-SCNC: 28 MMOL/L (ref 23–29)
CREAT SERPL-MCNC: 0.9 MG/DL (ref 0.5–1.4)
DIFFERENTIAL METHOD BLD: ABNORMAL
EOSINOPHIL # BLD AUTO: 0.1 K/UL (ref 0–0.5)
EOSINOPHIL NFR BLD: 1.4 % (ref 0–8)
ERYTHROCYTE [DISTWIDTH] IN BLOOD BY AUTOMATED COUNT: 12.4 % (ref 11.5–14.5)
EST. GFR  (NO RACE VARIABLE): >60 ML/MIN/1.73 M^2
GLUCOSE SERPL-MCNC: 100 MG/DL (ref 70–110)
HCT VFR BLD AUTO: 43.1 % (ref 40–54)
HGB BLD-MCNC: 14.8 G/DL (ref 14–18)
IMM GRANULOCYTES # BLD AUTO: 0.02 K/UL (ref 0–0.04)
IMM GRANULOCYTES NFR BLD AUTO: 0.3 % (ref 0–0.5)
LYMPHOCYTES # BLD AUTO: 1.7 K/UL (ref 1–4.8)
LYMPHOCYTES NFR BLD: 26.4 % (ref 18–48)
MCH RBC QN AUTO: 30.9 PG (ref 27–31)
MCHC RBC AUTO-ENTMCNC: 34.3 G/DL (ref 32–36)
MCV RBC AUTO: 90 FL (ref 82–98)
MONOCYTES # BLD AUTO: 0.3 K/UL (ref 0.3–1)
MONOCYTES NFR BLD: 5.1 % (ref 4–15)
NEUTROPHILS # BLD AUTO: 4.2 K/UL (ref 1.8–7.7)
NEUTROPHILS NFR BLD: 66.6 % (ref 38–73)
NRBC BLD-RTO: 0 /100 WBC
PLATELET # BLD AUTO: 104 K/UL (ref 150–450)
PMV BLD AUTO: 10.3 FL (ref 9.2–12.9)
POTASSIUM SERPL-SCNC: 3.9 MMOL/L (ref 3.5–5.1)
PROT SERPL-MCNC: 6.9 G/DL (ref 6–8.4)
RBC # BLD AUTO: 4.79 M/UL (ref 4.6–6.2)
SODIUM SERPL-SCNC: 139 MMOL/L (ref 136–145)
WBC # BLD AUTO: 6.26 K/UL (ref 3.9–12.7)

## 2025-01-27 PROCEDURE — 36415 COLL VENOUS BLD VENIPUNCTURE: CPT | Performed by: INTERNAL MEDICINE

## 2025-01-27 PROCEDURE — 85025 COMPLETE CBC W/AUTO DIFF WBC: CPT | Performed by: INTERNAL MEDICINE

## 2025-01-27 PROCEDURE — 80053 COMPREHEN METABOLIC PANEL: CPT | Performed by: INTERNAL MEDICINE

## 2025-01-28 NOTE — PROGRESS NOTES
"[facility] Hematology/Oncology  PROGRESS NOTE -  Follow-up Visit      Subjective:       Patient ID:   NAME: Wilver Cee Jr. : 1985     39 y.o. male    Referring Doc: Saji Eason, *  Other Physicians: Greg Smith; Juan; Roxanne; Jimenez Solis  (ENT)           Chief Complaint: tcp f/u       History of Present Illness:     Patient returns today for a regularly scheduled follow-up visit.  The patient is here today to go over the results of the recently ordered labs, tests and studies. He is here with his dad.       He is feeling ok. He reports that he is "good". No excessive bleeding and bruising. No CP, SOB, HA's or N/V.     His big toe nail is looking much better    He has chronic hives issues since about 2022 and sees Dr Poncho Laureano and Dr Martinez    He saw Homer Sotelo NP on 2024 and sees her again on 2025                   ROS:   GEN: normal without any fever, night sweats or weight loss; chronic hives and LBP; recent hematoma under nail of right big toe which is much better  HEENT: normal with no HA's, sore throat, stiff neck, changes in vision  CV: normal with no CP, SOB, PND, CRUZ or orthopnea  PULM: normal with no SOB, cough, hemoptysis, sputum or pleuritic pain  GI: normal with no abdominal pain, nausea, vomiting, constipation, diarrhea, melanotic stools, BRBPR, or hematemesis  : normal with no hematuria, dysuria  BREAST: normal with no mass, discharge, pain  SKIN: normal with no rash, erythema, bruising, or swelling    Pain Scale: 0    Allergies:  Review of patient's allergies indicates:   Allergen Reactions    Promethazine-dm Other (See Comments)     aggressive behavior, anxiety,  confusion, irritability, dizziness, and diarrhea.       Medications:    Current Outpatient Medications:     acetaminophen (TYLENOL) 500 MG tablet, Take 1 tablet (500 mg total) by mouth every 4 (four) hours as needed for Pain or Temperature greater than (100.4)., Disp: , Rfl: 0    ascorbic " "Acid (VITAMIN C) 500 mg CpSR, Take 500 mg by mouth once daily., Disp: , Rfl:     cetirizine (ZYRTEC) 10 MG tablet, Take 10 mg by mouth once daily. Rotates with claritin and allegra, Disp: , Rfl:     fexofenadine (ALLEGRA) 180 MG tablet, Take 180 mg by mouth once daily. Rotates with zyrtec and claritin, Disp: , Rfl:     loratadine (CLARITIN) 10 mg tablet, Take 10 mg by mouth daily as needed for Allergies. Rotates with zyrtec and allegra, Disp: , Rfl:     melatonin (MELATIN) 3 mg tablet, Take 6 mg by mouth nightly as needed for Insomnia., Disp: , Rfl:     nystatin (MYCOSTATIN) powder, Apply topically 2 (two) times daily., Disp: 60 g, Rfl: 1    ondansetron (ZOFRAN-ODT) 8 MG TbDL, Take 1 tablet (8 mg total) by mouth 3 (three) times daily as needed (vomiting)., Disp: 15 tablet, Rfl: 3    vitamin D (VITAMIN D3) 1000 units Tab, Take 2,000 Units by mouth once daily., Disp: , Rfl:     ibuprofen (ADVIL,MOTRIN) 200 MG tablet, Take 2 tablets (400 mg total) by mouth every 6 (six) hours as needed for Pain or Temperature greater than (100.4). (Patient not taking: Reported on 1/29/2025), Disp: , Rfl: 0    PMHx/PSHx Updates:  See patient's last visit with me on 7/31/2024  See H&P on 8/20/2021        Pathology:   Cancer Staging   No matching staging information was found for the patient.            Objective:     Vitals:  Blood pressure 114/82, pulse 88, temperature 98.1 °F (36.7 °C), temperature source Temporal, resp. rate 16, height 5' 9" (1.753 m), weight 97.1 kg (214 lb 1.6 oz).    Physical Examination:   GEN: no apparent distress, comfortable; awake and answers questions; overweight  HEAD: atraumatic and normocephalic  EYES: no pallor, no icterus, PERRLA  ENT: OMM, no pharyngeal erythema, external ears WNL; no nasal discharge; no thrush  NECK: no masses, thyroid normal, trachea midline, no LAD/LN's, supple  CV: RRR with no murmur; normal pulse; normal S1 and S2; no pedal edema  CHEST: Normal respiratory effort; CTAB; normal " breath sounds; no wheeze or crackles  ABDOM: nontender and nondistended; soft; normal bowel sounds; no rebound/guarding  MUSC/Skeletal: ROM normal; no crepitus; joints normal; no deformities or arthropathy  EXTREM: no clubbing, cyanosis, inflammation or swelling; small hematoma under nail of right big toe which seems to be appropriately growing out and looking much better  SKIN: no rashes, lesions, ulcers, petechiae or subcutaneous nodules; chronic but nonpuritic urticaria on arms, legs and trunk  : no laird  NEURO: grossly intact; motor/sensory WNL; moving all 4 extremities; no tremors  PSYCH: normal mood, affect and behavior  LYMPH: normal cervical, supraclavicular, axillary and groin LN's        Labs:     Lab Results   Component Value Date    WBC 6.26 01/27/2025    HGB 14.8 01/27/2025    HCT 43.1 01/27/2025    MCV 90 01/27/2025     (L) 01/27/2025     CMP  Sodium   Date Value Ref Range Status   01/27/2025 139 136 - 145 mmol/L Final   09/20/2018 136 134 - 144 mmol/L      Potassium   Date Value Ref Range Status   01/27/2025 3.9 3.5 - 5.1 mmol/L Final     Chloride   Date Value Ref Range Status   01/27/2025 105 95 - 110 mmol/L Final   09/20/2018 103 98 - 110 mmol/L      CO2   Date Value Ref Range Status   01/27/2025 28 23 - 29 mmol/L Final     Glucose   Date Value Ref Range Status   01/27/2025 100 70 - 110 mg/dL Final   09/20/2018 106 (H) 70 - 99 mg/dL      BUN   Date Value Ref Range Status   01/27/2025 16 6 - 20 mg/dL Final     Creatinine   Date Value Ref Range Status   01/27/2025 0.9 0.5 - 1.4 mg/dL Final   09/20/2018 0.97 0.60 - 1.40 mg/dL      Calcium   Date Value Ref Range Status   01/27/2025 9.3 8.7 - 10.5 mg/dL Final     Total Protein   Date Value Ref Range Status   01/27/2025 6.9 6.0 - 8.4 g/dL Final     Albumin   Date Value Ref Range Status   01/27/2025 4.2 3.5 - 5.2 g/dL Final   09/20/2018 3.9 3.1 - 4.7 g/dL      Total Bilirubin   Date Value Ref Range Status   01/27/2025 0.5 0.1 - 1.0 mg/dL Final      Comment:     For infants and newborns, interpretation of results should be based  on gestational age, weight and in agreement with clinical  observations.    Premature Infant recommended reference ranges:  Up to 24 hours.............<8.0 mg/dL  Up to 48 hours............<12.0 mg/dL  3-5 days..................<15.0 mg/dL  6-29 days.................<15.0 mg/dL       Alkaline Phosphatase   Date Value Ref Range Status   01/27/2025 97 55 - 135 U/L Final     AST   Date Value Ref Range Status   01/27/2025 14 10 - 40 U/L Final     ALT   Date Value Ref Range Status   01/27/2025 13 10 - 44 U/L Final     Anion Gap   Date Value Ref Range Status   01/27/2025 6 (L) 8 - 16 mmol/L Final     eGFR if    Date Value Ref Range Status   04/06/2022 106 > OR = 60 mL/min/1.73m2 Final     eGFR if non    Date Value Ref Range Status   04/06/2022 91 > OR = 60 mL/min/1.73m2 Final       Platelet Antibody, Direct, Flow Cytometry  Order: [id number]  Status:  Final result   Visible to patient:  Yes (seen) Next appt:  12/01/2021 at 03:15 PM in Rheumatology (Saji Eason MD) Dx:  Thrombocytopenia; Other fatigue   0 Result Notes   Ref Range & Units 1 mo ago   ANTI-IGG NEGATIVE NEGATIVE              Platelet Ab.IgG NEGATIVE NEGATIVE    Platelet Ab.IgM NEGATIVE NEGATIVE      HIV Ag/Ab 4th Gen NON-REACTIVE     Hep A IgM NON-REACTIVE NON-REACTIVE    Comment  For additional information, please refer to http://education.[employer]/faq/ACG033 (This link is being provided for informational/ educational purposes only.)   Hepatitis B Surface Ag NON-REACTIVE NON-REACTIVE    Hep B C IgM NON-REACTIVE NON-REACTIVE    Hepatitis C Ab NON-REACTIVE NON-REACTIVE    Signal/Cutoff <1.00 0.01    Comment:     HCV antibody was non-reactive     EBV Interp.  Suggestive of a recent Dylan-Barr virus infection         Radiology/Diagnostic Studies:    No results found.    I have reviewed all available lab results and radiology  reports.    Assessment/Plan:   (1) 39 y.o. male with diagnosis of thrombocytopenia who has been referred by Saji Eason, * for evaluation by medical hematology/oncology.   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; the mild splenomegaly fits in with this diagnosis    12/29/2021:  - plats at 109,000 and adequate    6/29/2022:  - latest plats at 111,000 and adequate for him  - Hgb and WBC were both normal  - no vomiting episodes since last year  - he did not go see neurology    12/28/2022:  - plats 104,000 and relatively stable  - suspect he has some form of underlying autoimmune mediated process    6/28/2023:  - latest plats at 95,000 and relatively stable    1/31/2024:  - no anemia; WBC adequate  - platelets at 97,000 and relatively stable    7/31/2024:  - CBC adequate  - plats at 111,000  - no current anemia    1/29/2025:  - latest labs stable  - check labs every 6 months     (2) Autistic disorder and MR     (3) Hypercholesterolemia     (4) Chronic urticaria - followed by Dr Martinez     (5) Cyclic vomiting syndrome - followed by Dr Oliveira  - ? Some sort of gastric or intestinal motility issue due to his underlying autoimmune process    6/28/2023:  - recent trial of amitriptyline per Dr Oliveira's direction  - recent vomiting episode about 2 weeks ago     (6) Positive JOSH screen     (7) Anxiety    (8) Small hematoma under nail of right big toe - seen by Dr Saini - will ask Dr Laureano to see him as well          VISIT DIAGNOSES:      Thrombocytopenia    Cyclic vomiting syndrome    Chronic fatigue          PLAN:  1. continue with supportive care measures and observation  2. CBC every  6 months  3. Avoid NSAIDS, ASA products etc if at all possible  4. F/u with podiatry as directed  5. F/u with PCP, GI, Rheum     RTC in 6 months  Fax note to Homer Sotelo, Roxanne, Juan; Yeny/Poncho navarro    Discussion:     COVID-19 Discussion:    I had long discussion with patient and any applicable family about the COVID-19 coronavirus epidemic and the recommended precautions with regard to cancer and/or hematology patients. I have re-iterated the CDC recommendations for adequate hand washing, use of hand -like products, and coughing into elbow, etc. In addition, especially for our patients who are on chemotherapy and/or our otherwise immunocompromised patients, I have recommended avoidance of crowds, including movie theaters, restaurants, churches, etc. I have recommended avoidance of any sick or symptomatic family members and/or friends. I have also recommended avoidance of any raw and unwashed food products, and general avoidance of food items that have not been prepared by themselves. The patient has been asked to call us immediately with any symptom developments, issues, questions or other general concerns.     I spent over 25 mins of time with the patient. Reviewed results of the recently ordered labs, tests and studies; made directives with regards to the results. Over half of this time was spent couseling and coordinating care.    I have explained all of the above in detail and the patient understands all of the current recommendation(s). I have answered all of their questions to the best of my ability and to their complete satisfaction.   The patient is to continue with the current management plan.            Electronically signed by Alexis Gomes MD

## 2025-01-29 ENCOUNTER — OFFICE VISIT (OUTPATIENT)
Facility: CLINIC | Age: 40
End: 2025-01-29
Payer: MEDICARE

## 2025-01-29 VITALS
WEIGHT: 214.13 LBS | DIASTOLIC BLOOD PRESSURE: 82 MMHG | HEIGHT: 69 IN | BODY MASS INDEX: 31.72 KG/M2 | RESPIRATION RATE: 16 BRPM | HEART RATE: 88 BPM | TEMPERATURE: 98 F | SYSTOLIC BLOOD PRESSURE: 114 MMHG

## 2025-01-29 DIAGNOSIS — R11.15 CYCLIC VOMITING SYNDROME: ICD-10-CM

## 2025-01-29 DIAGNOSIS — D69.6 THROMBOCYTOPENIA: Primary | ICD-10-CM

## 2025-01-29 DIAGNOSIS — R53.82 CHRONIC FATIGUE: ICD-10-CM

## 2025-01-29 PROCEDURE — G2211 COMPLEX E/M VISIT ADD ON: HCPCS | Mod: S$PBB,,, | Performed by: INTERNAL MEDICINE

## 2025-01-29 PROCEDURE — 99999 PR PBB SHADOW E&M-EST. PATIENT-LVL III: CPT | Mod: PBBFAC,,, | Performed by: INTERNAL MEDICINE

## 2025-01-29 PROCEDURE — 99214 OFFICE O/P EST MOD 30 MIN: CPT | Mod: S$PBB,,, | Performed by: INTERNAL MEDICINE

## 2025-01-29 PROCEDURE — 99213 OFFICE O/P EST LOW 20 MIN: CPT | Mod: PBBFAC,PN | Performed by: INTERNAL MEDICINE

## 2025-02-22 DIAGNOSIS — Z00.00 ENCOUNTER FOR MEDICARE ANNUAL WELLNESS EXAM: ICD-10-CM

## 2025-03-05 ENCOUNTER — OFFICE VISIT (OUTPATIENT)
Dept: FAMILY MEDICINE | Facility: CLINIC | Age: 40
End: 2025-03-05
Payer: MEDICARE

## 2025-03-05 VITALS
SYSTOLIC BLOOD PRESSURE: 110 MMHG | BODY MASS INDEX: 32.29 KG/M2 | OXYGEN SATURATION: 98 % | WEIGHT: 218 LBS | HEART RATE: 83 BPM | HEIGHT: 69 IN | DIASTOLIC BLOOD PRESSURE: 70 MMHG

## 2025-03-05 DIAGNOSIS — L50.8 CHRONIC URTICARIA: ICD-10-CM

## 2025-03-05 DIAGNOSIS — E66.3 OVER WEIGHT: ICD-10-CM

## 2025-03-05 DIAGNOSIS — Z79.899 ENCOUNTER FOR LONG-TERM (CURRENT) USE OF MEDICATIONS: ICD-10-CM

## 2025-03-05 DIAGNOSIS — Z13.220 SCREENING, LIPID: ICD-10-CM

## 2025-03-05 DIAGNOSIS — R11.15 CYCLIC VOMITING SYNDROME: ICD-10-CM

## 2025-03-05 DIAGNOSIS — D69.6 THROMBOCYTOPENIA: ICD-10-CM

## 2025-03-05 DIAGNOSIS — F84.0 AUTISTIC DISORDER: Primary | ICD-10-CM

## 2025-03-05 PROCEDURE — 99214 OFFICE O/P EST MOD 30 MIN: CPT | Mod: S$GLB,,, | Performed by: NURSE PRACTITIONER

## 2025-03-05 NOTE — PROGRESS NOTES
SUBJECTIVE:      Patient ID: Wilver Cee Jr. is a 39 y.o. male.    Chief Complaint: Annual Exam    Patient is here today with his dad. He is following up on autism, idiopathic urticaria, cyclic vomiting and thrombocytopenia(followed by Dr Gomes)      History of Present Illness    CHIEF COMPLAINT:  Wilver presents today for annual follow-up.    MEDICAL HISTORY:  He has a history of autoimmune deficiency, chronic urticaria, and cyclic vomiting. His chronic urticaria manifests as sudden and unpredictable hives that can progress to full body involvement within an hour of clear skin. He experiences associated swelling in fingers and hands with soreness, as well as lip swelling.    MEDICATIONS:  He alternates between Allegra, Claritin, and Zyrtec as needed for hives. He takes vitamin C 500 and vitamin D3 supplements. He uses Nystatin powder as needed for groin rashes.    DIET AND EXERCISE:  He exercises twice weekly at the gym, using the treadmill for 30-40 minutes per session. He reports having daily bowel movements without difficulty.         Past Surgical History:   Procedure Laterality Date    ADENOIDECTOMY      MULTIPLE TOOTH EXTRACTIONS      TYMPANOSTOMY TUBE PLACEMENT       Family History   Problem Relation Name Age of Onset    Epilepsy Mother      Hypertension Father      Heart attack Father  54    No Known Problems Sister      No Known Problems Brother      No Known Problems Daughter      No Known Problems Son      No Known Problems Maternal Aunt      No Known Problems Maternal Uncle      No Known Problems Paternal Aunt      No Known Problems Paternal Uncle      No Known Problems Maternal Grandmother      No Known Problems Maternal Grandfather      No Known Problems Paternal Grandmother      No Known Problems Paternal Grandfather        Social History     Socioeconomic History    Marital status: Single   Occupational History    Occupation: disabled   Tobacco Use    Smoking status: Never    Smokeless  tobacco: Never   Substance and Sexual Activity    Alcohol use: No    Drug use: No    Sexual activity: Never   Social History Narrative    Live with father     Social Drivers of Health     Stress: Stress Concern Present (11/11/2020)    [employer] - Occupational Stress Questionnaire     Feeling of Stress : Rather much     Current Medications[1]  Review of patient's allergies indicates:   Allergen Reactions    Promethazine-dm Other (See Comments)     aggressive behavior, anxiety,  confusion, irritability, dizziness, and diarrhea.      Past Medical History:   Diagnosis Date    Allergy     Autistic disorder     Cyclic vomiting syndrome 11/11/2020    Hyperlipidemia     Thrombocytopenia 8/19/2021    Urticaria      Past Surgical History:   Procedure Laterality Date    ADENOIDECTOMY      MULTIPLE TOOTH EXTRACTIONS      TYMPANOSTOMY TUBE PLACEMENT         Review of Systems   Constitutional:  Negative for appetite change, chills, diaphoresis, fatigue, fever and unexpected weight change.   HENT:  Negative for ear discharge, facial swelling, hearing loss, nosebleeds and trouble swallowing.    Eyes:  Negative for photophobia, pain and visual disturbance.   Respiratory:  Negative for apnea, choking, shortness of breath and wheezing.    Cardiovascular:  Negative for chest pain and palpitations.   Gastrointestinal:  Negative for abdominal pain, blood in stool and vomiting.   Endocrine: Negative for polyphagia.   Genitourinary:  Negative for difficulty urinating and hematuria.   Musculoskeletal:  Negative for gait problem and joint swelling.   Skin:  Negative for pallor.   Neurological:  Negative for dizziness, seizures, speech difficulty and weakness.   Hematological:  Does not bruise/bleed easily.   Psychiatric/Behavioral:  Negative for agitation, confusion, dysphoric mood, self-injury, sleep disturbance and suicidal ideas. The patient is not nervous/anxious.       OBJECTIVE:      Vitals:    03/05/25  "0806   BP: 110/70   Pulse: 83   SpO2: 98%   Weight: 98.9 kg (218 lb)   Height: 5' 9" (1.753 m)     Physical Exam  Vitals and nursing note reviewed.   Constitutional:       General: He is not in acute distress.     Appearance: He is well-developed.   HENT:      Head: Normocephalic and atraumatic.      Nose: Nose normal.      Mouth/Throat:      Pharynx: Uvula midline.   Eyes:      General: Lids are normal.      Conjunctiva/sclera: Conjunctivae normal.      Pupils: Pupils are equal, round, and reactive to light.      Right eye: Pupil is round and reactive.      Left eye: Pupil is round and reactive.   Neck:      Thyroid: No thyromegaly.      Vascular: No carotid bruit.   Cardiovascular:      Rate and Rhythm: Normal rate and regular rhythm.      Pulses: Normal pulses.      Heart sounds: Normal heart sounds. No murmur heard.  Pulmonary:      Effort: Pulmonary effort is normal.      Breath sounds: Normal breath sounds. No wheezing, rhonchi or rales.   Abdominal:      General: Bowel sounds are normal.      Palpations: Abdomen is soft. Abdomen is not rigid.      Tenderness: There is no abdominal tenderness.   Musculoskeletal:         General: Normal range of motion.      Cervical back: Normal range of motion and neck supple.      Right lower leg: No edema.      Left lower leg: No edema.   Lymphadenopathy:      Cervical: No cervical adenopathy.   Skin:     General: Skin is warm and dry.      Nails: There is no clubbing.   Neurological:      Mental Status: He is alert and oriented to person, place, and time.   Psychiatric:         Mood and Affect: Mood normal.         Speech: Speech normal.         Behavior: Behavior normal. Behavior is cooperative.         Thought Content: Thought content normal.         Judgment: Judgment normal.       No visits with results within 1 Month(s) from this visit.   Latest known visit with results is:   Lab Visit on 01/27/2025   Component Date Value Ref Range Status    WBC 01/27/2025 6.26  3.90 " - 12.70 K/uL Final    RBC 01/27/2025 4.79  4.60 - 6.20 M/uL Final    Hemoglobin 01/27/2025 14.8  14.0 - 18.0 g/dL Final    Hematocrit 01/27/2025 43.1  40.0 - 54.0 % Final    MCV 01/27/2025 90  82 - 98 fL Final    MCH 01/27/2025 30.9  27.0 - 31.0 pg Final    MCHC 01/27/2025 34.3  32.0 - 36.0 g/dL Final    RDW 01/27/2025 12.4  11.5 - 14.5 % Final    Platelets 01/27/2025 104 (L)  150 - 450 K/uL Final    MPV 01/27/2025 10.3  9.2 - 12.9 fL Final    Immature Granulocytes 01/27/2025 0.3  0.0 - 0.5 % Final    Gran # (ANC) 01/27/2025 4.2  1.8 - 7.7 K/uL Final    Immature Grans (Abs) 01/27/2025 0.02  0.00 - 0.04 K/uL Final    Comment: Mild elevation in immature granulocytes is non specific and   can be seen in a variety of conditions including stress response,   acute inflammation, trauma and pregnancy. Correlation with other   laboratory and clinical findings is essential.      Lymph # 01/27/2025 1.7  1.0 - 4.8 K/uL Final    Mono # 01/27/2025 0.3  0.3 - 1.0 K/uL Final    Eos # 01/27/2025 0.1  0.0 - 0.5 K/uL Final    Baso # 01/27/2025 0.01  0.00 - 0.20 K/uL Final    nRBC 01/27/2025 0  0 /100 WBC Final    Gran % 01/27/2025 66.6  38.0 - 73.0 % Final    Lymph % 01/27/2025 26.4  18.0 - 48.0 % Final    Mono % 01/27/2025 5.1  4.0 - 15.0 % Final    Eosinophil % 01/27/2025 1.4  0.0 - 8.0 % Final    Basophil % 01/27/2025 0.2  0.0 - 1.9 % Final    Differential Method 01/27/2025 Automated   Final    Sodium 01/27/2025 139  136 - 145 mmol/L Final    Potassium 01/27/2025 3.9  3.5 - 5.1 mmol/L Final    Chloride 01/27/2025 105  95 - 110 mmol/L Final    CO2 01/27/2025 28  23 - 29 mmol/L Final    Glucose 01/27/2025 100  70 - 110 mg/dL Final    BUN 01/27/2025 16  6 - 20 mg/dL Final    Creatinine 01/27/2025 0.9  0.5 - 1.4 mg/dL Final    Calcium 01/27/2025 9.3  8.7 - 10.5 mg/dL Final    Total Protein 01/27/2025 6.9  6.0 - 8.4 g/dL Final    Albumin 01/27/2025 4.2  3.5 - 5.2 g/dL Final    Total Bilirubin 01/27/2025 0.5  0.1 - 1.0 mg/dL Final     Comment: For infants and newborns, interpretation of results should be based  on gestational age, weight and in agreement with clinical  observations.    Premature Infant recommended reference ranges:  Up to 24 hours.............<8.0 mg/dL  Up to 48 hours............<12.0 mg/dL  3-5 days..................<15.0 mg/dL  6-29 days.................<15.0 mg/dL      Alkaline Phosphatase 01/27/2025 97  55 - 135 U/L Final    AST 01/27/2025 14  10 - 40 U/L Final    ALT 01/27/2025 13  10 - 44 U/L Final    eGFR 01/27/2025 >60.0  >60 mL/min/1.73 m^2 Final    Anion Gap 01/27/2025 6 (L)  8 - 16 mmol/L Final      Assessment:       1. Autistic disorder    2. Chronic urticaria    3. Thrombocytopenia    4. Cyclic vomiting syndrome    5. Screening, lipid    6. Over weight    7. Encounter for long-term (current) use of medications        Plan:       Autistic disorder  Stable  Forms completed    Chronic urticaria  -     Lipid Panel; Future; Expected date: 03/19/2025  -     Urinalysis, Reflex to Urine Culture Urine, Clean Catch; Future; Expected date: 03/05/2025    Thrombocytopenia  -     Lipid Panel; Future; Expected date: 03/19/2025  -     Urinalysis, Reflex to Urine Culture Urine, Clean Catch; Future; Expected date: 03/05/2025    Cyclic vomiting syndrome  -     Lipid Panel; Future; Expected date: 03/19/2025  -     Urinalysis, Reflex to Urine Culture Urine, Clean Catch; Future; Expected date: 03/05/2025    Screening, lipid  -     Lipid Panel; Future; Expected date: 03/19/2025    Over weight  -     Lipid Panel; Future; Expected date: 03/19/2025  -     Urinalysis, Reflex to Urine Culture Urine, Clean Catch; Future; Expected date: 03/05/2025    Encounter for long-term (current) use of medications  -     TSH; Future; Expected date: 03/05/2025      Assessment & Plan    CHRONIC URTICARIA (HIVES):  - Assessed current medication regimen, including antihistamine rotation for hive management.  - Continued Allegra, Claritin, and Zyrtec, rotating  as needed for hive management.  - Noted that the patient experiences chronic urticaria (hives) that can appear suddenly and spread from head to toe.  - Observed that the patient's skin is currently clear of hives, but the condition remains unpredictable.    CYCLIC VOMITING SYNDROME:  - Acknowledged that the patient has cyclic vomiting syndrome.  - Evaluated need for tetanus/pertussis vaccination, considering potential risks due to patient's cyclic vomiting.    GROIN RASHES:  - Continued use of powder for groin rashes as needed.    WEIGHT MANAGEMENT:  - Recommend watching sweets intake and continuing to work on weight management.  - Wilver to continue current exercise routine of gym visits on Wednesdays and Saturdays.    MEDICATIONS/SUPPLEMENTS:  - Continued vitamin C 500 mg daily.  - Continued vitamin D3 daily.    LABS:  - Reviewed recent labs ordered by Dr. Gomes, including chemistry and blood counts.  - Ordered cholesterol test and urinalysis.    FOLLOW UP:  - Follow up in March for next annual visit to align with 90-day requirement.         Follow up in about 1 year (around 3/5/2026) for wellness .  This note was generated with the assistance of ambient listening technology. Verbal consent was obtained by the patient and accompanying visitor(s) for the recording of patient appointment to facilitate this note. I attest to having reviewed and edited the generated note for accuracy, though some syntax or spelling errors may persist. Please contact the author of this note for any clarification.        3/5/2025 SOBIA Ojeda, FNP             [1]   Current Outpatient Medications   Medication Sig Dispense Refill    ascorbic Acid (VITAMIN C) 500 mg CpSR Take 500 mg by mouth once daily.      cetirizine (ZYRTEC) 10 MG tablet Take 10 mg by mouth once daily. Rotates with claritin and allegra      fexofenadine (ALLEGRA) 180 MG tablet Take 180 mg by mouth once daily. Rotates with zyrtec and claritin      loratadine  (CLARITIN) 10 mg tablet Take 10 mg by mouth daily as needed for Allergies. Rotates with zyrtec and allegra      nystatin (MYCOSTATIN) powder Apply topically 2 (two) times daily. 60 g 1    ondansetron (ZOFRAN-ODT) 8 MG TbDL Take 1 tablet (8 mg total) by mouth 3 (three) times daily as needed (vomiting). 15 tablet 3    vitamin D (VITAMIN D3) 1000 units Tab Take 2,000 Units by mouth once daily.       No current facility-administered medications for this visit.

## 2025-06-15 ENCOUNTER — PATIENT MESSAGE (OUTPATIENT)
Dept: FAMILY MEDICINE | Facility: CLINIC | Age: 40
End: 2025-06-15
Payer: MEDICARE

## 2025-06-16 RX ORDER — NYSTATIN 100000 [USP'U]/G
POWDER TOPICAL 2 TIMES DAILY
Qty: 60 G | Refills: 1 | Status: SHIPPED | OUTPATIENT
Start: 2025-06-16

## 2025-07-16 ENCOUNTER — TELEPHONE (OUTPATIENT)
Facility: CLINIC | Age: 40
End: 2025-07-16
Payer: MEDICARE

## 2025-07-16 NOTE — TELEPHONE ENCOUNTER
Spoke to father, confirmed that lab orders are in system to have done at any [facility] facility, informed that these are non fasting and can be done within a few days of scheduled appt to insure we have results for that appt. Verbalized understanding. I offered to schedule lab appt to which he refused at this time.

## 2025-07-16 NOTE — TELEPHONE ENCOUNTER
----- Message from Basilia sent at 7/15/2025 10:27 AM CDT -----  Pt's Father has some questions regarding the patients labs. Pt will need an appointment for labs as well.     # [phone]

## 2025-07-23 ENCOUNTER — LAB VISIT (OUTPATIENT)
Dept: LAB | Facility: HOSPITAL | Age: 40
End: 2025-07-23
Attending: INTERNAL MEDICINE
Payer: MEDICARE

## 2025-07-23 DIAGNOSIS — R53.82 CHRONIC FATIGUE: ICD-10-CM

## 2025-07-23 DIAGNOSIS — D69.6 THROMBOCYTOPENIA: ICD-10-CM

## 2025-07-23 LAB
ABSOLUTE EOSINOPHIL (SMH): 0.13 K/UL
ABSOLUTE MONOCYTE (SMH): 0.28 K/UL (ref 0.3–1)
ABSOLUTE NEUTROPHIL COUNT (SMH): 4.1 K/UL (ref 1.8–7.7)
ALBUMIN SERPL-MCNC: 4.1 G/DL (ref 3.5–5.2)
ALP SERPL-CCNC: 106 UNIT/L (ref 55–135)
ALT SERPL-CCNC: 16 UNIT/L (ref 10–44)
ANION GAP (SMH): 7 MMOL/L (ref 8–16)
AST SERPL-CCNC: 15 UNIT/L (ref 10–40)
BASOPHILS # BLD AUTO: 0.02 K/UL
BASOPHILS NFR BLD AUTO: 0.3 %
BILIRUB SERPL-MCNC: 0.4 MG/DL (ref 0.1–1)
BUN SERPL-MCNC: 18 MG/DL (ref 6–20)
CALCIUM SERPL-MCNC: 9 MG/DL (ref 8.7–10.5)
CHLORIDE SERPL-SCNC: 106 MMOL/L (ref 95–110)
CO2 SERPL-SCNC: 28 MMOL/L (ref 23–29)
CREAT SERPL-MCNC: 0.9 MG/DL (ref 0.5–1.4)
ERYTHROCYTE [DISTWIDTH] IN BLOOD BY AUTOMATED COUNT: 12.2 % (ref 11.5–14.5)
GFR SERPLBLD CREATININE-BSD FMLA CKD-EPI: >60 ML/MIN/1.73/M2
GLUCOSE SERPL-MCNC: 109 MG/DL (ref 70–110)
HCT VFR BLD AUTO: 43.3 % (ref 40–54)
HGB BLD-MCNC: 15 GM/DL (ref 14–18)
IMM GRANULOCYTES # BLD AUTO: 0.04 K/UL (ref 0–0.04)
IMM GRANULOCYTES NFR BLD AUTO: 0.6 % (ref 0–0.5)
LYMPHOCYTES # BLD AUTO: 1.83 K/UL (ref 1–4.8)
MCH RBC QN AUTO: 30.3 PG (ref 27–31)
MCHC RBC AUTO-ENTMCNC: 34.6 G/DL (ref 32–36)
MCV RBC AUTO: 88 FL (ref 82–98)
NUCLEATED RBC (/100WBC) (SMH): 0 /100 WBC
PLATELET # BLD AUTO: 114 K/UL (ref 150–450)
PMV BLD AUTO: 10.8 FL (ref 9.2–12.9)
POTASSIUM SERPL-SCNC: 4 MMOL/L (ref 3.5–5.1)
PROT SERPL-MCNC: 6.7 GM/DL (ref 6–8.4)
RBC # BLD AUTO: 4.95 M/UL (ref 4.6–6.2)
RELATIVE EOSINOPHIL (SMH): 2 % (ref 0–8)
RELATIVE LYMPHOCYTE (SMH): 28.6 % (ref 18–48)
RELATIVE MONOCYTE (SMH): 4.4 % (ref 4–15)
RELATIVE NEUTROPHIL (SMH): 64.1 % (ref 38–73)
SODIUM SERPL-SCNC: 141 MMOL/L (ref 136–145)
WBC # BLD AUTO: 6.39 K/UL (ref 3.9–12.7)

## 2025-07-23 PROCEDURE — 85025 COMPLETE CBC W/AUTO DIFF WBC: CPT

## 2025-07-23 PROCEDURE — 80053 COMPREHEN METABOLIC PANEL: CPT

## 2025-07-23 PROCEDURE — 36415 COLL VENOUS BLD VENIPUNCTURE: CPT

## 2025-07-28 ENCOUNTER — PATIENT MESSAGE (OUTPATIENT)
Facility: CLINIC | Age: 40
End: 2025-07-28
Payer: MEDICARE

## 2025-08-05 NOTE — PROGRESS NOTES
"[facility] Hematology/Oncology  PROGRESS NOTE -  Follow-up Visit      Subjective:       Patient ID:   NAME: Wilver Cee Jr. : 1985     40 y.o. male    Referring Doc: Saji Eason, *  Other Physicians: Greg Smith; Juan; Roxanne; Jimenez Solis  (ENT)           Chief Complaint: tcp f/u       History of Present Illness:     Patient returns today for a regularly scheduled follow-up visit.  The patient is here today to go over the results of the recently ordered labs, tests and studies. He is here with his dad.       He is feeling ok. He reports that he is "good". No excessive bleeding and bruising. No CP, SOB, HA's or N/V.     He has chronic hives issues since about 2022 and sees Dr Poncho Laureano with derm and Dr Martinez with allergy/immunology     He saw Homer Sotelo NP on 3/5/2025 and is now planning to see Scott CERVANTES                   ROS:   GEN: normal without any fever, night sweats or weight loss; chronic hives   HEENT: normal with no HA's, sore throat, stiff neck, changes in vision  CV: normal with no CP, SOB, PND, CRUZ or orthopnea  PULM: normal with no SOB, cough, hemoptysis, sputum or pleuritic pain  GI: normal with no abdominal pain, nausea, vomiting, constipation, diarrhea, melanotic stools, BRBPR, or hematemesis  : normal with no hematuria, dysuria  BREAST: normal with no mass, discharge, pain  SKIN: normal with no rash, erythema, bruising, or swelling    Pain Scale: 0    Allergies:  Review of patient's allergies indicates:   Allergen Reactions    Promethazine-dm Other (See Comments)     aggressive behavior, anxiety,  confusion, irritability, dizziness, and diarrhea.       Medications:    Current Outpatient Medications:     ascorbic Acid (VITAMIN C) 500 mg CpSR, Take 500 mg by mouth once daily., Disp: , Rfl:     cetirizine (ZYRTEC) 10 MG tablet, Take 10 mg by mouth once daily. Rotates with claritin and allegra, Disp: , Rfl:     fexofenadine (ALLEGRA) 180 MG tablet, Take 180 " "mg by mouth once daily. Rotates with zyrtec and claritin, Disp: , Rfl:     loratadine (CLARITIN) 10 mg tablet, Take 10 mg by mouth daily as needed for Allergies. Rotates with zyrtec and allegra, Disp: , Rfl:     nystatin (MYCOSTATIN) powder, Apply topically 2 (two) times daily., Disp: 60 g, Rfl: 1    ondansetron (ZOFRAN-ODT) 8 MG TbDL, Take 1 tablet (8 mg total) by mouth 3 (three) times daily as needed (vomiting)., Disp: 15 tablet, Rfl: 3    vitamin D (VITAMIN D3) 1000 units Tab, Take 2,000 Units by mouth once daily., Disp: , Rfl:     PMHx/PSHx Updates:  See patient's last visit with me on 1/29/2025  See H&P on 8/20/2021        Pathology:   Cancer Staging   No matching staging information was found for the patient.            Objective:     Vitals:  Blood pressure 115/81, pulse 86, temperature 98.5 °F (36.9 °C), temperature source Temporal, resp. rate 17, height 5' 9" (1.753 m), weight 101.4 kg (223 lb 9.6 oz), SpO2 98%.    Physical Examination:   GEN: no apparent distress, comfortable; awake and answers questions; overweight  HEAD: atraumatic and normocephalic  EYES: no pallor, no icterus, PERRLA  ENT: OMM, no pharyngeal erythema, external ears WNL; no nasal discharge; no thrush  NECK: no masses, thyroid normal, trachea midline, no LAD/LN's, supple  CV: RRR with no murmur; normal pulse; normal S1 and S2; no pedal edema  CHEST: Normal respiratory effort; CTAB; normal breath sounds; no wheeze or crackles  ABDOM: nontender and nondistended; soft; normal bowel sounds; no rebound/guarding  MUSC/Skeletal: ROM normal; no crepitus; joints normal; no deformities or arthropathy  EXTREM: no clubbing, cyanosis, inflammation or swelling;   SKIN: no rashes, lesions, ulcers, petechiae or subcutaneous nodules; chronic but nonpuritic urticaria on arms, legs and trunk  : no laird  NEURO: grossly intact; motor/sensory WNL; moving all 4 extremities; no tremors  PSYCH: normal mood, affect and behavior  LYMPH: normal cervical, " supraclavicular, axillary and groin LN's        Labs:     Lab Results   Component Value Date    WBC 6.39 07/23/2025    HGB 15.0 07/23/2025    HCT 43.3 07/23/2025    MCV 88 07/23/2025     (L) 07/23/2025     CMP  Sodium   Date Value Ref Range Status   07/23/2025 141 136 - 145 mmol/L Final   09/20/2018 136 134 - 144 mmol/L      Potassium   Date Value Ref Range Status   07/23/2025 4.0 3.5 - 5.1 mmol/L Final     Chloride   Date Value Ref Range Status   07/23/2025 106 95 - 110 mmol/L Final   09/20/2018 103 98 - 110 mmol/L      CO2   Date Value Ref Range Status   07/23/2025 28 23 - 29 mmol/L Final     Glucose   Date Value Ref Range Status   07/23/2025 109 70 - 110 mg/dL Final   09/20/2018 106 (H) 70 - 99 mg/dL      BUN   Date Value Ref Range Status   07/23/2025 18 6 - 20 mg/dL Final     Creatinine   Date Value Ref Range Status   07/23/2025 0.9 0.5 - 1.4 mg/dL Final   09/20/2018 0.97 0.60 - 1.40 mg/dL      Calcium   Date Value Ref Range Status   07/23/2025 9.0 8.7 - 10.5 mg/dL Final     Protein Total   Date Value Ref Range Status   07/23/2025 6.7 6.0 - 8.4 gm/dL Final     Albumin   Date Value Ref Range Status   07/23/2025 4.1 3.5 - 5.2 g/dL Final   09/20/2018 3.9 3.1 - 4.7 g/dL      Bilirubin Total   Date Value Ref Range Status   07/23/2025 0.4 0.1 - 1.0 mg/dL Final     Comment:     For infants and newborns, interpretation of results should be based   on gestational age, weight and in agreement with clinical   observations.    Premature Infant recommended reference ranges:   0-24 hours:  <8.0 mg/dL   24-48 hours: <12.0 mg/dL   3-5 days:    <15.0 mg/dL   6-29 days:   <15.0 mg/dL     ALP   Date Value Ref Range Status   07/23/2025 106 55 - 135 unit/L Final     AST   Date Value Ref Range Status   07/23/2025 15 10 - 40 unit/L Final     ALT   Date Value Ref Range Status   07/23/2025 16 10 - 44 unit/L Final     Anion Gap   Date Value Ref Range Status   07/23/2025 7 (L) 8 - 16 mmol/L Final     eGFR if    Date  Value Ref Range Status   04/06/2022 106 > OR = 60 mL/min/1.73m2 Final     eGFR if non    Date Value Ref Range Status   04/06/2022 91 > OR = 60 mL/min/1.73m2 Final       Platelet Antibody, Direct, Flow Cytometry  Order: [id number]  Status:  Final result   Visible to patient:  Yes (seen) Next appt:  12/01/2021 at 03:15 PM in Rheumatology (Saji Eason MD) Dx:  Thrombocytopenia; Other fatigue   0 Result Notes   Ref Range & Units 1 mo ago   ANTI-IGG NEGATIVE NEGATIVE              Platelet Ab.IgG NEGATIVE NEGATIVE    Platelet Ab.IgM NEGATIVE NEGATIVE      HIV Ag/Ab 4th Gen NON-REACTIVE     Hep A IgM NON-REACTIVE NON-REACTIVE    Comment  For additional information, please refer to http://education.[employer]/faq/RIZ756 (This link is being provided for informational/ educational purposes only.)   Hepatitis B Surface Ag NON-REACTIVE NON-REACTIVE    Hep B C IgM NON-REACTIVE NON-REACTIVE    Hepatitis C Ab NON-REACTIVE NON-REACTIVE    Signal/Cutoff <1.00 0.01    Comment:     HCV antibody was non-reactive     EBV Interp.  Suggestive of a recent Dylan-Barr virus infection         Radiology/Diagnostic Studies:    No results found.    I have reviewed all available lab results and radiology reports.    Assessment/Plan:   (1) 40 y.o. male with diagnosis of thrombocytopenia who has been referred by Saji Eason, * for evaluation by medical hematology/oncology.   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; the mild splenomegaly fits in with this diagnosis    12/29/2021:  - plats at 109,000 and adequate    6/29/2022:  - latest plats at 111,000 and adequate for him  - Hgb  and WBC were both normal  - no vomiting episodes since last year  - he did not go see neurology    12/28/2022:  - plats 104,000 and relatively stable  - suspect he has some form of underlying autoimmune mediated process    6/28/2023:  - latest plats at 95,000 and relatively stable    1/31/2024:  - no anemia; WBC adequate  - platelets at 97,000 and relatively stable    7/31/2024:  - CBC adequate  - plats at 111,000  - no current anemia    1/29/2025:  - latest labs stable  - check labs every 6 months    8/5/2025:  - plats at 114,000  - no anemia  - WBC WNL     (2) Autistic disorder and MR     (3) Hypercholesterolemia     (4) Chronic urticaria - followed by Dr Martinez     (5) Cyclic vomiting syndrome - followed by Dr Oliveira  - ? Some sort of gastric or intestinal motility issue due to his underlying autoimmune process    6/28/2023:  - recent trial of amitriptyline per Dr Oliveira's direction  - recent vomiting episode about 2 weeks ago    8/6/2025:  - no events since last July 2024     (6) Positive [facility]OS[facility] screen     (7) Anxiety    (8) Small hematoma under nail of right big toe - seen by Dr Saini - will ask Dr Laureano to see him as well          VISIT DIAGNOSES:      Thrombocytopenia          PLAN:  1. continue with supportive care measures and observation  2. CBC every 6 months  3. Avoid NSAIDS, ASA products etc if at all possible  4. F/u with podiatry as directed  5. F/u with PCP, GI, Rheum     RTC in 6 months  Fax note to Scott CERVANTES (new), Juan Oliveira; Yeny/Poncho navarro    Discussion:     COVID-19 Discussion:    I had long discussion with patient and any applicable family about the COVID-19 coronavirus epidemic and the recommended precautions with regard to cancer and/or hematology patients. I have re-iterated the CDC recommendations for adequate hand washing, use of hand -like products, and coughing into elbow, etc. In addition, especially for our patients who are on chemotherapy  and/or our otherwise immunocompromised patients, I have recommended avoidance of crowds, including movie theaters, restaurants, churches, etc. I have recommended avoidance of any sick or symptomatic family members and/or friends. I have also recommended avoidance of any raw and unwashed food products, and general avoidance of food items that have not been prepared by themselves. The patient has been asked to call us immediately with any symptom developments, issues, questions or other general concerns.     I spent over 25 mins of time with the patient. Reviewed results of the recently ordered labs, tests and studies; made directives with regards to the results. Over half of this time was spent couseling and coordinating care.    I have explained all of the above in detail and the patient understands all of the current recommendation(s). I have answered all of their questions to the best of my ability and to their complete satisfaction.   The patient is to continue with the current management plan.            Electronically signed by Alexis Gomes MD

## 2025-08-06 ENCOUNTER — OFFICE VISIT (OUTPATIENT)
Facility: CLINIC | Age: 40
End: 2025-08-06
Payer: MEDICARE

## 2025-08-06 VITALS
OXYGEN SATURATION: 98 % | TEMPERATURE: 99 F | HEART RATE: 86 BPM | DIASTOLIC BLOOD PRESSURE: 81 MMHG | HEIGHT: 69 IN | BODY MASS INDEX: 33.12 KG/M2 | WEIGHT: 223.63 LBS | SYSTOLIC BLOOD PRESSURE: 115 MMHG | RESPIRATION RATE: 17 BRPM

## 2025-08-06 DIAGNOSIS — D69.6 THROMBOCYTOPENIA: Primary | ICD-10-CM

## 2025-08-06 DIAGNOSIS — R11.15 CYCLICAL VOMITING WITH NAUSEA: ICD-10-CM

## 2025-08-06 PROCEDURE — 99999 PR PBB SHADOW E&M-EST. PATIENT-LVL III: CPT | Mod: PBBFAC,,, | Performed by: INTERNAL MEDICINE

## 2025-08-06 PROCEDURE — G2211 COMPLEX E/M VISIT ADD ON: HCPCS | Mod: ,,, | Performed by: INTERNAL MEDICINE

## 2025-08-06 PROCEDURE — 99213 OFFICE O/P EST LOW 20 MIN: CPT | Mod: PBBFAC,PN | Performed by: INTERNAL MEDICINE

## 2025-08-06 PROCEDURE — 99214 OFFICE O/P EST MOD 30 MIN: CPT | Mod: S$PBB,,, | Performed by: INTERNAL MEDICINE

## 2025-08-06 RX ORDER — ONDANSETRON 8 MG/1
8 TABLET, ORALLY DISINTEGRATING ORAL 3 TIMES DAILY PRN
Qty: 15 TABLET | Refills: 3 | Status: SHIPPED | OUTPATIENT
Start: 2025-08-06